# Patient Record
Sex: FEMALE | Race: ASIAN | NOT HISPANIC OR LATINO | Employment: FULL TIME | URBAN - METROPOLITAN AREA
[De-identification: names, ages, dates, MRNs, and addresses within clinical notes are randomized per-mention and may not be internally consistent; named-entity substitution may affect disease eponyms.]

---

## 2017-04-20 LAB
ABO + RH BLD: NORMAL
ABO + RH BLD: NORMAL
C TRACH DNA SPEC QL PROBE+SIG AMP: NEGATIVE
HBV SURFACE AG SERPL QL IA: NEGATIVE
HIV 1+2 AB+HIV1 P24 AG SERPL QL IA: NORMAL
N GONORRHOEA DNA SPEC QL PROBE+SIG AMP: NEGATIVE
RUBELLA ANTIBODY IGG QUANTITATIVE: NORMAL IU/ML
T PALLIDUM IGG SER QL: NORMAL

## 2017-10-12 LAB — GROUP B STREP PCR: NEGATIVE

## 2017-11-13 ENCOUNTER — HOSPITAL ENCOUNTER (INPATIENT)
Facility: CLINIC | Age: 32
LOS: 3 days | Discharge: HOME | End: 2017-11-16
Attending: OBSTETRICS & GYNECOLOGY | Admitting: OBSTETRICS & GYNECOLOGY
Payer: COMMERCIAL

## 2017-11-13 PROCEDURE — 25000132 ZZH RX MED GY IP 250 OP 250 PS 637: Performed by: OBSTETRICS & GYNECOLOGY

## 2017-11-13 PROCEDURE — 12000029 ZZH R&B OB INTERMEDIATE

## 2017-11-13 PROCEDURE — S0191 MISOPROSTOL, ORAL, 200 MCG: HCPCS | Performed by: OBSTETRICS & GYNECOLOGY

## 2017-11-13 RX ORDER — HYDROXYZINE HYDROCHLORIDE 50 MG/1
100 TABLET, FILM COATED ORAL ONCE
Status: DISCONTINUED | OUTPATIENT
Start: 2017-11-13 | End: 2017-11-14

## 2017-11-13 RX ORDER — PRENATAL VIT/IRON FUM/FOLIC AC 27MG-0.8MG
1 TABLET ORAL DAILY
COMMUNITY

## 2017-11-13 RX ORDER — MISOPROSTOL 100 UG/1
25 TABLET ORAL EVERY 4 HOURS PRN
Status: DISCONTINUED | OUTPATIENT
Start: 2017-11-13 | End: 2017-11-14

## 2017-11-13 RX ORDER — HYDROXYZINE HYDROCHLORIDE 50 MG/1
50 TABLET, FILM COATED ORAL ONCE
Status: DISCONTINUED | OUTPATIENT
Start: 2017-11-13 | End: 2017-11-14

## 2017-11-13 RX ADMIN — Medication 25 MCG: at 22:04

## 2017-11-13 NOTE — IP AVS SNAPSHOT
[address]    [place] [address]    Phone:  [phone]                                       After Visit Summary   11/13/2017    Esperanza Guerrero    MRN: [id number]           After Visit Summary Signature Page     I have received my discharge instructions, and my questions have been answered. I have discussed any challenges I see with this plan with the nurse or doctor.    ..........................................................................................................................................  Patient/Patient Representative Signature      ..........................................................................................................................................  Patient Representative Print Name and Relationship to Patient    ..................................................               ................................................  Date                                            Time    ..........................................................................................................................................  Reviewed by Signature/Title    ...................................................              ..............................................  Date                                                            Time

## 2017-11-13 NOTE — IP AVS SNAPSHOT
MRN:[id number]                      After Visit Summary   11/13/2017    Esperanza Guerrero    MRN: [id number]           Thank you!     Thank you for choosing [facility] for your care. Our goal is always to provide you with excellent care. Hearing back from our patients is one way we can continue to improve our services. Please take a few minutes to complete the written survey that you may receive in the mail after you visit with us. Thank you!        Patient Information     Date Of Birth          1985        About your hospital stay     You were admitted on:  November 13, 2017 You last received care in the:  [address]    You were discharged on:  November 16, 2017       Who to Call     For medical emergencies, please call 911.  For non-urgent questions about your medical care, please call your primary care provider or clinic, [phone]          Attending Provider     Provider Specialty    Ligia Wagner MD OB/Gyn    Elisa Rebolledo MD OB/Gyn       Primary Care Provider Office Phone # Fax #    Elisa Rebolledo [phone]      Further instructions from your care team       Postpartum Vaginal Delivery Instructions    Activity       Ask family and friends for help when you need it.    Do not place anything in your vagina for 6 weeks.    You are not restricted on other activities, but take it easy for a few weeks to allow your body to recover from delivery.  You are able to do any activities you feel up to that point.    No driving until you have stopped taking your pain medications (usually two weeks after delivery).     Call your health care provider if you have any of these symptoms:       Increased pain, swelling, redness, or fluid around your stiches from an episiotomy or perineal tear.    A fever above 100.4 F (38 C) with or without chills when placing a thermometer under your tongue.    You soak a sanitary pad with blood within 1 hour, or you  "see blood clots larger than a golf ball.    Bleeding that lasts more than 6 weeks.    Vaginal discharge that smells bad.    Severe pain, cramping or tenderness in your lower belly area.    A need to urinate more frequently (use the toilet more often), more urgently (use the toilet very quickly), or it burns when you urinate.    Nausea and vomiting.    Redness, swelling or pain around a vein in your leg.    Problems breastfeeding or a red or painful area on your breast.    Chest pain and cough or are gasping for air.    Problems coping with sadness, anxiety, or depression.  If you have any concerns about hurting yourself or the baby, call your provider immediately.     You have questions or concerns after you return home.     Keep your hands clean:  Always wash your hands before touching your perineal area and stitches.  This helps reduce your risk of infection.  If your hands aren't dirty, you may use an alcohol hand-rub to clean your hands. Keep your nails clean and short.        Pending Results     No orders found from 11/11/2017 to 11/14/2017.            Statement of Approval     Ordered          11/16/17 0903  I have reviewed and agree with all the recommendations and orders detailed in this document.  EFFECTIVE NOW     Approved and electronically signed by:  Rizwana Vickers PA-C             Admission Information     Date & Time Provider Department Dept. Phone    11/13/2017 Elisa Rebolledo MD [address] [phone]      Your Vitals Were     Blood Pressure Pulse Temperature Respirations Height Weight    117/57 66 98.4  F (36.9  C) (Oral) 16 1.626 m (5' 4\") 87.5 kg (193 lb)    BMI (Body Mass Index)                   33.13 kg/m2           MyChart Information     [facility] lets you send messages to your doctor, view your test results, renew your prescriptions, schedule appointments and more. To sign up, go to [email or web address] . Click on \"Log in\" on the left side of the " "screen, which will take you to the Welcome page. Then click on \"Sign up Now\" on the right side of the page.     You will be asked to enter the access code listed below, as well as some personal information. Please follow the directions to create your username and password.     Your access code is: [email or web address]  Expires: 2018 12:16 PM     Your access code will  in 90 days. If you need help or a new code, please call your [facility] clinic or [phone].        Care EveryWhere ID     This is your Care EveryWhere ID. This could be used by other organizations to access your [facility] medical records  [id number]        Equal Access to Services     VICKY MEDRANO : Faizan Nunez, la moscoso, curtis fisher, nickolas mackey. So [facility] [phone].    ATENCIÓN: Si habla español, tiene a keys disposición servicios gratuitos de asistencia lingüística. Llame al [phone].    We comply with applicable federal civil rights laws and [place] laws. We do not discriminate on the basis of race, color, national origin, age, disability, sex, sexual orientation, or gender identity.               Review of your medicines      START taking        Dose / Directions    acetaminophen 325 MG tablet   Commonly known as:  TYLENOL        Dose:  650 mg   Take 2 tablets (650 mg) by mouth every 4 hours as needed for mild pain or fever (greater than or equal to 38? C /100.4? F (oral) or 38.5? C/ 101.4? F (core).)   Quantity:  100 tablet   Refills:  0       hydrocortisone 2.5 % cream        Place rectally 3 times daily as needed (hemorrhoids)   Refills:  0       ibuprofen 400 MG tablet   Commonly known as:  ADVIL/MOTRIN        Dose:  400-800 mg   Take 1-2 tablets (400-800 mg) by mouth every 6 hours as needed for other (cramping)   Quantity:  120 tablet   Refills:  0       lanolin ointment        Apply topically every hour as needed for dry skin (sore nipples)   Refills:  0       " senna-docusate 8.6-50 MG per tablet   Commonly known as:  SENOKOT-S;PERICOLACE        Dose:  1-2 tablet   Take 1-2 tablets by mouth 2 times daily   Quantity:  100 tablet   Refills:  0         CONTINUE these medicines which have NOT CHANGED        Dose / Directions    prenatal multivitamin plus iron 27-0.8 MG Tabs per tablet        Dose:  1 tablet   Take 1 tablet by mouth daily   Refills:  0            Where to get your medicines      Some of these will need a paper prescription and others can be bought over the counter. Ask your nurse if you have questions.     You don't need a prescription for these medications     acetaminophen 325 MG tablet    hydrocortisone 2.5 % cream    ibuprofen 400 MG tablet    lanolin ointment    senna-docusate 8.6-50 MG per tablet                Protect others around you: Learn how to safely use, store and throw away your medicines at www.disposemymeds.org.             Medication List: This is a list of all your medications and when to take them. Check marks below indicate your daily home schedule. Keep this list as a reference.      Medications           Morning Afternoon Evening Bedtime As Needed    acetaminophen 325 MG tablet   Commonly known as:  TYLENOL   Take 2 tablets (650 mg) by mouth every 4 hours as needed for mild pain or fever (greater than or equal to 38? C /100.4? F (oral) or 38.5? C/ 101.4? F (core).)   Last time this was given:  650 mg on 11/16/2017 11:11 AM                                hydrocortisone 2.5 % cream   Place rectally 3 times daily as needed (hemorrhoids)                                ibuprofen 400 MG tablet   Commonly known as:  ADVIL/MOTRIN   Take 1-2 tablets (400-800 mg) by mouth every 6 hours as needed for other (cramping)   Last time this was given:  800 mg on 11/16/2017 11:11 AM                                lanolin ointment   Apply topically every hour as needed for dry skin (sore nipples)                                prenatal multivitamin plus iron  27-0.8 MG Tabs per tablet   Take 1 tablet by mouth daily                                senna-docusate 8.6-50 MG per tablet   Commonly known as:  SENOKOT-S;PERICOLACE   Take 1-2 tablets by mouth 2 times daily   Last time this was given:  1 tablet on 11/16/2017 11:11 AM

## 2017-11-14 ENCOUNTER — ANESTHESIA (OUTPATIENT)
Dept: OBGYN | Facility: CLINIC | Age: 32
End: 2017-11-14
Payer: COMMERCIAL

## 2017-11-14 ENCOUNTER — ANESTHESIA EVENT (OUTPATIENT)
Dept: OBGYN | Facility: CLINIC | Age: 32
End: 2017-11-14
Payer: COMMERCIAL

## 2017-11-14 LAB
ABO + RH BLD: NORMAL
ABO + RH BLD: NORMAL
SPECIMEN EXP DATE BLD: NORMAL
T PALLIDUM IGG+IGM SER QL: NEGATIVE

## 2017-11-14 PROCEDURE — 25000128 H RX IP 250 OP 636: Performed by: ANESTHESIOLOGY

## 2017-11-14 PROCEDURE — 10907ZC DRAINAGE OF AMNIOTIC FLUID, THERAPEUTIC FROM PRODUCTS OF CONCEPTION, VIA NATURAL OR ARTIFICIAL OPENING: ICD-10-PCS | Performed by: OBSTETRICS & GYNECOLOGY

## 2017-11-14 PROCEDURE — 25000125 ZZHC RX 250: Performed by: ANESTHESIOLOGY

## 2017-11-14 PROCEDURE — 25000128 H RX IP 250 OP 636: Performed by: OBSTETRICS & GYNECOLOGY

## 2017-11-14 PROCEDURE — 12000037 ZZH R&B POSTPARTUM INTERMEDIATE

## 2017-11-14 PROCEDURE — 36415 COLL VENOUS BLD VENIPUNCTURE: CPT | Performed by: OBSTETRICS & GYNECOLOGY

## 2017-11-14 PROCEDURE — 00HU33Z INSERTION OF INFUSION DEVICE INTO SPINAL CANAL, PERCUTANEOUS APPROACH: ICD-10-PCS | Performed by: ANESTHESIOLOGY

## 2017-11-14 PROCEDURE — 25000125 ZZHC RX 250: Performed by: OBSTETRICS & GYNECOLOGY

## 2017-11-14 PROCEDURE — 86901 BLOOD TYPING SEROLOGIC RH(D): CPT | Performed by: OBSTETRICS & GYNECOLOGY

## 2017-11-14 PROCEDURE — 3E033VJ INTRODUCTION OF OTHER HORMONE INTO PERIPHERAL VEIN, PERCUTANEOUS APPROACH: ICD-10-PCS | Performed by: OBSTETRICS & GYNECOLOGY

## 2017-11-14 PROCEDURE — S0191 MISOPROSTOL, ORAL, 200 MCG: HCPCS | Performed by: OBSTETRICS & GYNECOLOGY

## 2017-11-14 PROCEDURE — 37000011 ZZH ANESTHESIA WARD SERVICE

## 2017-11-14 PROCEDURE — 72200001 ZZH LABOR CARE VAGINAL DELIVERY SINGLE

## 2017-11-14 PROCEDURE — 25000132 ZZH RX MED GY IP 250 OP 250 PS 637: Performed by: OBSTETRICS & GYNECOLOGY

## 2017-11-14 PROCEDURE — 3E0R3BZ INTRODUCTION OF ANESTHETIC AGENT INTO SPINAL CANAL, PERCUTANEOUS APPROACH: ICD-10-PCS | Performed by: ANESTHESIOLOGY

## 2017-11-14 PROCEDURE — 86780 TREPONEMA PALLIDUM: CPT | Performed by: OBSTETRICS & GYNECOLOGY

## 2017-11-14 PROCEDURE — 3E0P7VZ INTRODUCTION OF HORMONE INTO FEMALE REPRODUCTIVE, VIA NATURAL OR ARTIFICIAL OPENING: ICD-10-PCS | Performed by: OBSTETRICS & GYNECOLOGY

## 2017-11-14 PROCEDURE — 86900 BLOOD TYPING SEROLOGIC ABO: CPT | Performed by: OBSTETRICS & GYNECOLOGY

## 2017-11-14 RX ORDER — NALOXONE HYDROCHLORIDE 0.4 MG/ML
.1-.4 INJECTION, SOLUTION INTRAMUSCULAR; INTRAVENOUS; SUBCUTANEOUS
Status: DISCONTINUED | OUTPATIENT
Start: 2017-11-14 | End: 2017-11-14

## 2017-11-14 RX ORDER — CARBOPROST TROMETHAMINE 250 UG/ML
250 INJECTION, SOLUTION INTRAMUSCULAR
Status: DISCONTINUED | OUTPATIENT
Start: 2017-11-14 | End: 2017-11-14

## 2017-11-14 RX ORDER — ACETAMINOPHEN 325 MG/1
650 TABLET ORAL EVERY 4 HOURS PRN
Status: DISCONTINUED | OUTPATIENT
Start: 2017-11-14 | End: 2017-11-14

## 2017-11-14 RX ORDER — EPHEDRINE SULFATE 50 MG/ML
5 INJECTION, SOLUTION INTRAMUSCULAR; INTRAVENOUS; SUBCUTANEOUS
Status: DISCONTINUED | OUTPATIENT
Start: 2017-11-14 | End: 2017-11-16 | Stop reason: CLARIF

## 2017-11-14 RX ORDER — METHYLERGONOVINE MALEATE 0.2 MG/ML
200 INJECTION INTRAVENOUS
Status: DISCONTINUED | OUTPATIENT
Start: 2017-11-14 | End: 2017-11-14

## 2017-11-14 RX ORDER — NALOXONE HYDROCHLORIDE 0.4 MG/ML
.1-.4 INJECTION, SOLUTION INTRAMUSCULAR; INTRAVENOUS; SUBCUTANEOUS
Status: DISCONTINUED | OUTPATIENT
Start: 2017-11-14 | End: 2017-11-16 | Stop reason: HOSPADM

## 2017-11-14 RX ORDER — NALBUPHINE HYDROCHLORIDE 10 MG/ML
2.5-5 INJECTION, SOLUTION INTRAMUSCULAR; INTRAVENOUS; SUBCUTANEOUS EVERY 6 HOURS PRN
Status: DISCONTINUED | OUTPATIENT
Start: 2017-11-14 | End: 2017-11-16 | Stop reason: CLARIF

## 2017-11-14 RX ORDER — OXYTOCIN/0.9 % SODIUM CHLORIDE 30/500 ML
100 PLASTIC BAG, INJECTION (ML) INTRAVENOUS CONTINUOUS
Status: DISCONTINUED | OUTPATIENT
Start: 2017-11-14 | End: 2017-11-16 | Stop reason: HOSPADM

## 2017-11-14 RX ORDER — OXYTOCIN/0.9 % SODIUM CHLORIDE 30/500 ML
340 PLASTIC BAG, INJECTION (ML) INTRAVENOUS CONTINUOUS PRN
Status: DISCONTINUED | OUTPATIENT
Start: 2017-11-14 | End: 2017-11-16 | Stop reason: HOSPADM

## 2017-11-14 RX ORDER — LANOLIN 100 %
OINTMENT (GRAM) TOPICAL
Status: DISCONTINUED | OUTPATIENT
Start: 2017-11-14 | End: 2017-11-16 | Stop reason: HOSPADM

## 2017-11-14 RX ORDER — OXYCODONE AND ACETAMINOPHEN 5; 325 MG/1; MG/1
1 TABLET ORAL
Status: DISCONTINUED | OUTPATIENT
Start: 2017-11-14 | End: 2017-11-14

## 2017-11-14 RX ORDER — OXYTOCIN 10 [USP'U]/ML
10 INJECTION, SOLUTION INTRAMUSCULAR; INTRAVENOUS
Status: DISCONTINUED | OUTPATIENT
Start: 2017-11-14 | End: 2017-11-16 | Stop reason: HOSPADM

## 2017-11-14 RX ORDER — NALOXONE HYDROCHLORIDE 0.4 MG/ML
.1-.4 INJECTION, SOLUTION INTRAMUSCULAR; INTRAVENOUS; SUBCUTANEOUS
Status: DISCONTINUED | OUTPATIENT
Start: 2017-11-14 | End: 2017-11-16 | Stop reason: CLARIF

## 2017-11-14 RX ORDER — MISOPROSTOL 200 UG/1
400 TABLET ORAL
Status: DISCONTINUED | OUTPATIENT
Start: 2017-11-14 | End: 2017-11-16 | Stop reason: HOSPADM

## 2017-11-14 RX ORDER — LIDOCAINE HYDROCHLORIDE AND EPINEPHRINE 15; 5 MG/ML; UG/ML
INJECTION, SOLUTION EPIDURAL PRN
Status: DISCONTINUED | OUTPATIENT
Start: 2017-11-14 | End: 2017-11-15 | Stop reason: HOSPADM

## 2017-11-14 RX ORDER — HYDROCORTISONE 2.5 %
CREAM (GRAM) TOPICAL 3 TIMES DAILY PRN
Status: DISCONTINUED | OUTPATIENT
Start: 2017-11-14 | End: 2017-11-16 | Stop reason: HOSPADM

## 2017-11-14 RX ORDER — SODIUM CHLORIDE, SODIUM LACTATE, POTASSIUM CHLORIDE, CALCIUM CHLORIDE 600; 310; 30; 20 MG/100ML; MG/100ML; MG/100ML; MG/100ML
INJECTION, SOLUTION INTRAVENOUS CONTINUOUS
Status: DISCONTINUED | OUTPATIENT
Start: 2017-11-14 | End: 2017-11-14

## 2017-11-14 RX ORDER — OXYTOCIN 10 [USP'U]/ML
10 INJECTION, SOLUTION INTRAMUSCULAR; INTRAVENOUS
Status: DISCONTINUED | OUTPATIENT
Start: 2017-11-14 | End: 2017-11-14

## 2017-11-14 RX ORDER — ACETAMINOPHEN 325 MG/1
650 TABLET ORAL EVERY 4 HOURS PRN
Status: DISCONTINUED | OUTPATIENT
Start: 2017-11-14 | End: 2017-11-16 | Stop reason: HOSPADM

## 2017-11-14 RX ORDER — IBUPROFEN 400 MG/1
400-800 TABLET, FILM COATED ORAL EVERY 6 HOURS PRN
Status: DISCONTINUED | OUTPATIENT
Start: 2017-11-14 | End: 2017-11-16 | Stop reason: HOSPADM

## 2017-11-14 RX ORDER — AMOXICILLIN 250 MG
1-2 CAPSULE ORAL 2 TIMES DAILY
Status: DISCONTINUED | OUTPATIENT
Start: 2017-11-14 | End: 2017-11-16 | Stop reason: HOSPADM

## 2017-11-14 RX ORDER — ONDANSETRON 2 MG/ML
4 INJECTION INTRAMUSCULAR; INTRAVENOUS EVERY 6 HOURS PRN
Status: DISCONTINUED | OUTPATIENT
Start: 2017-11-14 | End: 2017-11-14

## 2017-11-14 RX ORDER — ROPIVACAINE HYDROCHLORIDE 2 MG/ML
10 INJECTION, SOLUTION EPIDURAL; INFILTRATION; PERINEURAL ONCE
Status: COMPLETED | OUTPATIENT
Start: 2017-11-14 | End: 2017-11-14

## 2017-11-14 RX ORDER — OXYTOCIN/0.9 % SODIUM CHLORIDE 30/500 ML
1-24 PLASTIC BAG, INJECTION (ML) INTRAVENOUS CONTINUOUS
Status: DISCONTINUED | OUTPATIENT
Start: 2017-11-14 | End: 2017-11-14

## 2017-11-14 RX ORDER — OXYTOCIN/0.9 % SODIUM CHLORIDE 30/500 ML
100-340 PLASTIC BAG, INJECTION (ML) INTRAVENOUS CONTINUOUS PRN
Status: COMPLETED | OUTPATIENT
Start: 2017-11-14 | End: 2017-11-14

## 2017-11-14 RX ORDER — BISACODYL 10 MG
10 SUPPOSITORY, RECTAL RECTAL DAILY PRN
Status: DISCONTINUED | OUTPATIENT
Start: 2017-11-16 | End: 2017-11-16 | Stop reason: HOSPADM

## 2017-11-14 RX ORDER — IBUPROFEN 400 MG/1
800 TABLET, FILM COATED ORAL
Status: DISCONTINUED | OUTPATIENT
Start: 2017-11-14 | End: 2017-11-14

## 2017-11-14 RX ORDER — LIDOCAINE 40 MG/G
CREAM TOPICAL
Status: DISCONTINUED | OUTPATIENT
Start: 2017-11-14 | End: 2017-11-14

## 2017-11-14 RX ORDER — FENTANYL CITRATE 50 UG/ML
50-100 INJECTION, SOLUTION INTRAMUSCULAR; INTRAVENOUS
Status: DISCONTINUED | OUTPATIENT
Start: 2017-11-14 | End: 2017-11-14

## 2017-11-14 RX ADMIN — ACETAMINOPHEN 650 MG: 325 TABLET, FILM COATED ORAL at 18:04

## 2017-11-14 RX ADMIN — ACETAMINOPHEN 650 MG: 325 TABLET, FILM COATED ORAL at 23:40

## 2017-11-14 RX ADMIN — SENNOSIDES AND DOCUSATE SODIUM 2 TABLET: 8.6; 5 TABLET ORAL at 23:40

## 2017-11-14 RX ADMIN — Medication 5 MG: at 12:10

## 2017-11-14 RX ADMIN — LIDOCAINE HYDROCHLORIDE AND EPINEPHRINE 3 ML: 15; 5 INJECTION, SOLUTION EPIDURAL at 10:59

## 2017-11-14 RX ADMIN — SODIUM CHLORIDE, POTASSIUM CHLORIDE, SODIUM LACTATE AND CALCIUM CHLORIDE: 600; 310; 30; 20 INJECTION, SOLUTION INTRAVENOUS at 07:29

## 2017-11-14 RX ADMIN — IBUPROFEN 800 MG: 400 TABLET ORAL at 18:05

## 2017-11-14 RX ADMIN — SODIUM CHLORIDE, POTASSIUM CHLORIDE, SODIUM LACTATE AND CALCIUM CHLORIDE 500 ML: 600; 310; 30; 20 INJECTION, SOLUTION INTRAVENOUS at 12:16

## 2017-11-14 RX ADMIN — Medication 25 MCG: at 02:20

## 2017-11-14 RX ADMIN — ROPIVACAINE HYDROCHLORIDE 10 ML: 2 INJECTION, SOLUTION EPIDURAL; INFILTRATION at 11:03

## 2017-11-14 RX ADMIN — SODIUM CHLORIDE, POTASSIUM CHLORIDE, SODIUM LACTATE AND CALCIUM CHLORIDE: 600; 310; 30; 20 INJECTION, SOLUTION INTRAVENOUS at 10:10

## 2017-11-14 RX ADMIN — Medication 12 ML/HR: at 11:14

## 2017-11-14 RX ADMIN — OXYTOCIN-SODIUM CHLORIDE 0.9% IV SOLN 30 UNIT/500ML 340 ML/HR: 30-0.9/5 SOLUTION at 16:42

## 2017-11-14 RX ADMIN — IBUPROFEN 800 MG: 400 TABLET ORAL at 23:40

## 2017-11-14 RX ADMIN — OXYTOCIN-SODIUM CHLORIDE 0.9% IV SOLN 30 UNIT/500ML 2 MILLI-UNITS/MIN: 30-0.9/5 SOLUTION at 07:29

## 2017-11-14 NOTE — PLAN OF CARE
Pitocin started at 0730.  Pt encouraged to ambulate.  0930- Contractions intensifying and pt is breathing through contractions.  Encouraged to make position changes.   supportive.  1030- Pt requesting epidural.  Pitocin at 10 mu.    1055- Dr Baker at bedside for epidural.  Time out performed and ropivacaine handed off to Dr Baker.    1115- Pt in left lateral position and is now 4/90/0, comfortable.  Dr Rebolledo notified.    1200-Late decels noted, interventions performed, see flowsheet.  Pt at 5cm.  1250- Dr Rebolledo at bedside to evaluate pt.  Continue with current plan of care.   1358- Pt is complete and Dr Rebolledo notified.  Plan to labor down.  1550- Dr Rebolledo at bedside, set up for delivery.  1555-  Begin pushing  O2 applied.  Dr Rebolledo at bedside continuously.  1620-  NNP called to attend delivery for vacuum.  Pt straight cathed per Dr Rebolledo.    1626-  Vacuum applied, see flow sheet for details.    1629-  Delivery of viable male per Dr Rebolledo.  Apgars 8,9.

## 2017-11-14 NOTE — PROVIDER NOTIFICATION
Dr Bernard hernandez, updated on but not limited to SVE 1.5/85/-2 haynes score of 7, uterine activity, and pain. Ptiocin orders received, may have fentanyl or epidural for pain. Plan to AROM this morning.

## 2017-11-14 NOTE — PROGRESS NOTES
"Labor Progress Note:    S: Pt is comfortable with epidural in place.     O:  /65  Temp 98  F (36.7  C) (Temporal)  Resp 16  Ht 1.626 m (5' 4\")  Wt 87.5 kg (193 lb)  BMI 33.13 kg/m2  SVE: 90/0   TOCO: Q 5 minutes  FHR: 135 baseline. + accelerations and no decelerations.     A/P: 32 yo  at 41+1/7 wks here for induction of labor.   1. Anticipate .   2. Epidural for pain management.   3. GBS negative.     Elisa Rebolledo    "

## 2017-11-14 NOTE — H&P
"2017      32yo  @ 41.1 wga here for post-dates IOL.  Pt underwent cytotec for cervical ripening last PM.  She is now on pitocin.  Prenatal care has been uncomplicated other than anemia, and h/o E. coli UTI that was treated.  There have been no significant changes in her medical history based upon review of chart, nursing records, and examination.      O pos, ABS neg, RI, RPR NR, HIV NR, HBsAg neg, GBS NEG      /81  Temp 98.4  F (36.9  C) (Temporal)  Resp (P) 18  Ht 1.626 m (5' 4\")  Wt 87.5 kg (193 lb)  BMI 33.13 kg/m2  Gen: NAD, A&O x 3  Abd: gravid, NT  SVE: 2/80/-2, amniotomy performed, clear fluid noted  FHT: cat I  TOCO: Q3 min      A/P: 32yo  @ 41.1 wga here for post-dates IOL.  AVSS.  - continue pitocin per protocol  - pain mgmt options reviewed with pt, including fentanyl, nitrous oxide, and epidural.  Pt would like epidural at some point.  - GBS neg  - EFW ~7.5-8 lb        TONEY ZHENG MD    "

## 2017-11-14 NOTE — ANESTHESIA PROCEDURE NOTES
Peripheral nerve/Neuraxial procedure note : epidural catheter  Pre-Procedure  Performed by MARLIN ISIDRO  Location: OB      Pre-Anesthestic Checklist: patient identified, IV checked, risks and benefits discussed, informed consent and pre-op evaluation    Timeout  Correct Patient: Yes   Correct Procedure: Yes   Correct Site: Yes   Correct Laterality: N/A   Correct Position: Yes   Site Marked: N/A   .   Procedure Documentation    .    Procedure:    Epidural catheter.  Insertion Site:L3-4  (midline approach) Injection technique: LORT saline   Local skin infiltrated with mL of 1% lidocaine.  KAREN at 5 cm     Patient Prep;mask, sterile gloves, povidone-iodine 7.5% surgical scrub, patient draped.  .  Needle: Touhy needle Needle Gauge: 17.    Needle Length (Inches) 3.5  # of attempts: 1 and # of redirects:  .   . .  Catheter threaded easily  5 cm epidural space.  .   .    Assessment/Narrative  Paresthesias: No.  .  .  Aspiration negative for heme or CSF  . Test dose of 3 mL lidocaine 1.5% w/ 1:200,000 epinephrine at. Test dose negative for signs of intravascular, subdural or intrathecal injection.

## 2017-11-14 NOTE — BRIEF OP NOTE
Delivery Summary    1. IUP at 41+1/7 wks. Induction of labor: cytotec, AROM and pitocin  2. Epidural for pain management.   3. Category 1- tracing through labor.   4. GBS negative.   5. VAVD for repetitive late decelerations. Kiwi 3 pulls, 90 seconds total and 1 pop off. NICU present. See op note  6. Epidural for pain management.   7. 300 cc ebl.   8. 2nd degree midline episiotomy. Repaired with 3-0 vicryl.   9. placena intact with 3-v cord. Pitocin is given.   10 Dr. Rebolledo for delivery.

## 2017-11-14 NOTE — PLAN OF CARE
Data: Patient admitted to room 215 at . Patient is a . Prenatal record and medical history reviewed with patient.   Obstetric History       T0      L0     SAB0   TAB0   Ectopic0   Multiple0   Live Births0       # Outcome Date GA Lbr Richie/2nd Weight Sex Delivery Anes PTL Lv   1 Current               Gestational age 41w0d. Vital signs per doc flowsheet. Fetal movement present. Patient reports induction of labor as reason for admission. Support persons Rudy present.  Action: Verbal consent for EFM, external fetal monitors applied. Admission assessment completed. Patient and support persons educated on labor process. Patient instructed to report change in fetal movement, contractions, vaginal leaking of fluid or bleeding, abdominal pain, or any concerns related to the pregnancy to her nurse/physician. Patient oriented to room, call light in reach.   Response: Dr. Rebolledo informed of patient arrival and induction orders recieved. Plan per provider is Cytotec protocol, may have Vistaril for sleep. Patient verbalized understanding of education and verbalized agreement with plan. Patient denies vaginal leaking, bleeding, or contractions.

## 2017-11-15 LAB — HGB BLD-MCNC: 10.1 G/DL (ref 11.7–15.7)

## 2017-11-15 PROCEDURE — 36415 COLL VENOUS BLD VENIPUNCTURE: CPT | Performed by: OBSTETRICS & GYNECOLOGY

## 2017-11-15 PROCEDURE — 12000037 ZZH R&B POSTPARTUM INTERMEDIATE

## 2017-11-15 PROCEDURE — 85018 HEMOGLOBIN: CPT | Performed by: OBSTETRICS & GYNECOLOGY

## 2017-11-15 PROCEDURE — 25000132 ZZH RX MED GY IP 250 OP 250 PS 637: Performed by: OBSTETRICS & GYNECOLOGY

## 2017-11-15 RX ADMIN — SENNOSIDES AND DOCUSATE SODIUM 1 TABLET: 8.6; 5 TABLET ORAL at 08:57

## 2017-11-15 RX ADMIN — IBUPROFEN 800 MG: 400 TABLET ORAL at 16:51

## 2017-11-15 RX ADMIN — IBUPROFEN 800 MG: 400 TABLET ORAL at 05:46

## 2017-11-15 RX ADMIN — ACETAMINOPHEN 650 MG: 325 TABLET, FILM COATED ORAL at 05:46

## 2017-11-15 RX ADMIN — SENNOSIDES AND DOCUSATE SODIUM 2 TABLET: 8.6; 5 TABLET ORAL at 20:39

## 2017-11-15 RX ADMIN — IBUPROFEN 800 MG: 400 TABLET ORAL at 11:13

## 2017-11-15 RX ADMIN — ACETAMINOPHEN 650 MG: 325 TABLET, FILM COATED ORAL at 16:51

## 2017-11-15 RX ADMIN — IBUPROFEN 800 MG: 400 TABLET ORAL at 23:22

## 2017-11-15 RX ADMIN — ACETAMINOPHEN 650 MG: 325 TABLET, FILM COATED ORAL at 11:13

## 2017-11-15 RX ADMIN — ACETAMINOPHEN 650 MG: 325 TABLET, FILM COATED ORAL at 23:22

## 2017-11-15 NOTE — PLAN OF CARE
Problem: Patient Care Overview  Goal: Plan of Care/Patient Progress Review  Outcome: Improving  Data: Vital signs within normal limits. Postpartum checks within normal limits - see flow record. Patient eating and drinking normally. Patient able to empty bladder independently and is up ambulating. No apparent signs of infection. perineum healing well. Patient performing self cares and is able to care for infant.  Action: Patient medicated during the shift for pain. See MAR. Patient reassessed within 1 hour after each medication and pain was improved - patient stated she was comfortable. Patient education done. See flow record.  Response: Positive attachment behaviors observed with infant. Support persons is present.   Plan: Anticipate discharge on 11/16.

## 2017-11-15 NOTE — L&D DELIVERY NOTE
HOSPITAL COURSE:  Esperanza Webber was admitted last evening for Cytotec induction.  She received Cytotec vaginally, and by the morning of 2017 underwent assisted rupture of membranes with clear fluid.  She was found to be 2 cm.  She was placed on Pitocin augmentation and received epidural placement around 4 cm dilation.  She made it to complete cervical dilation and was allowed to labor down over approximately 2 hours, at which time the fetal vertex was found to be at +3 station.  She had received epidural for pain management.      She began actively pushing, and with each push, there was a repetitive late deceleration nadiring to the 60s to 70s.  For that reason, vacuum-assisted vaginal delivery was decided.  Her bladder had been emptied.  She was found to be in the BUBBA presentation at +3 station, and NICU was present.  The charge nurse and Anesthesia were made aware.  Her bladder was emptied.  A Kiwi vacuum extractor was placed 2 cm anterior to the posterior fontanelle and was placed in the green zone over 3 pulls, 1 pop off and total accrued time of 90 seconds.  A male infant was delivered over a right lateral episiotomy with no evidence of nuchal cord.  A delayed cord clamp was performed.  The cord was then clamped by myself and cut by the father.  At this point, the episiotomy was repaired with 3-0 Vicryl in the usual fashion.        The placenta delivered spontaneously Schultze presentation, intact with a 3-vessel cord.  A left vaginal wall laceration was noted and was repaired with 3-0 Vicryl in the usual fashion.  At this point, all counts were correct.      ESTIMATED BLOOD LOSS:  300 mL.       Debrief was performed.  She will be transferred to postpartum in stable condition.         EDITH ORTEGA MD             D: 2017 17:13   T: 2017 22:52   MT: [id number]      Name:     ESPERANZA WEBBER   MRN:      [id number]        Account:        [id number]   :      1985            Delivery Date:  11/14/2017      Document: [id number]

## 2017-11-15 NOTE — PROGRESS NOTES
"OB Postpartum Note    S:  Patient without complaints.  Minimal lochia. Breastfeeding well. Pain controlled.     O:  Vitals were reviewed  Blood pressure 115/73, pulse 87, temperature 98.9  F (37.2  C), temperature source Oral, resp. rate 16, height 1.626 m (5' 4\"), weight 87.5 kg (193 lb), unknown if currently breastfeeding.          Abdomen - Fundus firm, at umbilicus, nontender        Extremities - No calf tenderness, no unilateral edema    ABO   Date Value Ref Range Status   11/14/2017 O  Final     RH(D)   Date Value Ref Range Status   11/14/2017 Pos  Final     No components found for: RUBELLA    A:   Postpartum Day #1 , s/p Vacuum assisted vaginal delivery after post-dates IOL- doing well    P:  1)  Routine care--reviewed perineal care.         2)  Encouraged pumping to initiate milk supply.         3)  Anticipate discharge tomorrow.     Ani Cameron MD    "

## 2017-11-15 NOTE — PLAN OF CARE
Problem: Patient Care Overview  Goal: Plan of Care/Patient Progress Review  Outcome: No Change  Patient rates perineum discomfort 3/10. Using ice packs and tucks for comfort. Taking ibuprofen and tylenol every 6 hours with good relief. Fundus firm at level of umbilicus, lochia within normal limits, no clots. Voiding adequately. Assisted with breast feeding positioning. Encouraged to call with questions/concerns.

## 2017-11-15 NOTE — LACTATION NOTE
Initial Lactation visit. Hand out given. Recommend unlimited, frequent breast feedings: At least 8 - 12 times every 24 hours. Avoid pacifiers and supplementation with formula unless medically indicated. Explained benefits of holding baby skin on skin to help promote better breastfeeding outcomes. Encouraged Esperanza to have RN come assist with feedings.  She states the L side is harder to latch to.  Reviewed with her the importance of being patient and working on baby getting latched.  Will revisit as needed.    Nat Harrison RN, IBCLC

## 2017-11-15 NOTE — ANESTHESIA POSTPROCEDURE EVALUATION
Patient: Esperanza Guerrero    * No procedures listed *    Diagnosis:* No pre-op diagnosis entered *  Diagnosis Additional Information: No value filed.    Anesthesia Type:  No value filed.    Note:  Anesthesia Post Evaluation    Patient location during evaluation: bedside (Postpartum Unit)  Patient participation: Able to fully participate in evaluation  Level of consciousness: awake  Pain management: adequate  Airway patency: patent  Cardiovascular status: acceptable  Respiratory status: acceptable  Hydration status: acceptable  PONV: none     Anesthetic complications: None    Comments: No epidural-related complaints except point tenderness at insertion site.          Last vitals:  Vitals:    11/14/17 2340 11/15/17 0700 11/15/17 1541   BP: 122/75 115/73 (!) (P) 100/39   Pulse:  87    Resp: 16 16 (P) 16   Temp:  37.2  C (98.9  F) (P) 36.8  C (98.2  F)         Electronically Signed By: Jerardo Oneill MD  November 15, 2017  4:08 PM

## 2017-11-15 NOTE — PLAN OF CARE
Data: Esperanza Guerrero transferred to [facility] via wheelchair at 1900. Baby transferred via parent's arms.  Action: Receiving unit notified of transfer: Yes. Patient and family notified of room change. Report given to Maria Dolores HAIDER RN at 1915. Belongings sent to receiving unit. Accompanied by Registered Nurse. Oriented patient to surroundings. Call light within reach. ID bands double-checked with receiving RN.  Response: Patient tolerated transfer and is stable.

## 2017-11-15 NOTE — PLAN OF CARE
Pt. admitted from L&D  via wheelchair and transferred to bed with SBA. Pt. arrived with baby and was accompanied by Rudy,  and arrived with personal belongings. Report was taken from Ann KNOTT RN in L&D. VSS. Fundus is firm and midline.  Vaginal bleeding is scant.  SL in right hand.  Pt. oriented to the room and call light system.

## 2017-11-16 VITALS
TEMPERATURE: 98.4 F | BODY MASS INDEX: 32.95 KG/M2 | SYSTOLIC BLOOD PRESSURE: 117 MMHG | WEIGHT: 193 LBS | DIASTOLIC BLOOD PRESSURE: 57 MMHG | HEART RATE: 66 BPM | RESPIRATION RATE: 16 BRPM | HEIGHT: 64 IN

## 2017-11-16 PROCEDURE — 25000132 ZZH RX MED GY IP 250 OP 250 PS 637: Performed by: OBSTETRICS & GYNECOLOGY

## 2017-11-16 RX ORDER — AMOXICILLIN 250 MG
1-2 CAPSULE ORAL 2 TIMES DAILY
Qty: 100 TABLET | Status: ON HOLD | COMMUNITY
Start: 2017-11-16 | End: 2023-06-26

## 2017-11-16 RX ORDER — HYDROCORTISONE 2.5 %
CREAM (GRAM) TOPICAL 3 TIMES DAILY PRN
Status: ON HOLD | COMMUNITY
Start: 2017-11-16 | End: 2023-06-26

## 2017-11-16 RX ORDER — IBUPROFEN 400 MG/1
400-800 TABLET, FILM COATED ORAL EVERY 6 HOURS PRN
Qty: 120 TABLET | Status: ON HOLD | COMMUNITY
Start: 2017-11-16 | End: 2020-09-30

## 2017-11-16 RX ORDER — LANOLIN 100 %
OINTMENT (GRAM) TOPICAL
Status: ON HOLD | COMMUNITY
Start: 2017-11-16 | End: 2023-06-26

## 2017-11-16 RX ORDER — ACETAMINOPHEN 325 MG/1
650 TABLET ORAL EVERY 4 HOURS PRN
Qty: 100 TABLET | Status: ON HOLD | COMMUNITY
Start: 2017-11-16 | End: 2020-09-30

## 2017-11-16 RX ADMIN — ACETAMINOPHEN 650 MG: 325 TABLET, FILM COATED ORAL at 11:11

## 2017-11-16 RX ADMIN — IBUPROFEN 800 MG: 400 TABLET ORAL at 05:01

## 2017-11-16 RX ADMIN — ACETAMINOPHEN 650 MG: 325 TABLET, FILM COATED ORAL at 05:01

## 2017-11-16 RX ADMIN — SENNOSIDES AND DOCUSATE SODIUM 1 TABLET: 8.6; 5 TABLET ORAL at 11:11

## 2017-11-16 RX ADMIN — IBUPROFEN 800 MG: 400 TABLET ORAL at 11:11

## 2017-11-16 NOTE — PLAN OF CARE
Problem: Patient Care Overview  Goal: Plan of Care/Patient Progress Review  Outcome: Improving  Fundus firm and bleeding wnl.  VSS.  Voiding without difficulty.  Taking tylenol and ibuprofen every 6 hours with good relief.  Nipple everter given for flatter left nipple.  Independent with baby cares.  Encouraged to call with questions or concerns.  Will continue to monitor.

## 2017-11-16 NOTE — PLAN OF CARE
Problem: Patient Care Overview  Goal: Plan of Care/Patient Progress Review  Outcome: Adequate for Discharge Date Met: 11/16/17  Patient doing well, excited to go home. Good pain control with tylenol and ibuprofen PO. Mild perineal discomfort, using ice packs and tucks. Fundus firm at level of umbilicus, lochia scant. Independent with self and infant cares. Explained discharge instructions to patient and spouse and answered all questions/concerns. Patient has breast pump at home.

## 2017-11-16 NOTE — PLAN OF CARE
Problem: Patient Care Overview  Goal: Plan of Care/Patient Progress Review  Outcome: Improving  VSS. Fundus firm with appropriate vaginal flow. Pain managed well with tylenol & ibuprofen. Working on breastfeeding infant at least every 3 hours. Up in room, voiding without difficulty. Independent with self and baby cares. Receiving good support from family at bedside. Will continue to monitor.

## 2017-11-16 NOTE — LACTATION NOTE
Follow up visit.  Infant feeding better overnight.  L side is more difficult.  Was able to latch well with breast support in football hold during visit.  L nipple does not gayle as far as R so baby has more trouble initially.  Infant fed well on L during visit with audible swallowing.  Encouraged Esperanza to be more assertive in working on the L side and not to switch just because baby is fussing.  She was able to get him to latch well.  Reviewed signs infant is getting enough, process of milk coming in and outpatient lactation resources if needed upon discharge.  She had no further questions or concerns.   Nat Harrison  RN, IBCLC

## 2017-11-16 NOTE — PROGRESS NOTES
#2 Postpartum V VD    Pt states doing well.  Pain well controlled.  Nursing.    Afebrile, VSS. HGB 10.1  Fundus firm, light flow.  Had BM.   Voiding w/o problems.  Ext: w/o edema or pain.    Normal postpartum course.    Plan routine postpartum care.  Inst for discharge to home.   Recheck in 6 weeks or prn at [facility].

## 2018-01-28 ENCOUNTER — HEALTH MAINTENANCE LETTER (OUTPATIENT)
Age: 33
End: 2018-01-28

## 2020-07-22 NOTE — PROGRESS NOTES
"Date: 2020 11:41:37  Clinician: Jerardo Ferguson  Clinician NPI: [id number]  Patient: Esperanza Guerrero  Patient : 1985  Patient Address: [address], [place] [address]  Patient Phone: [phone]  Visit Protocol: URI  Patient Summary:  Esperanza is a 34 year old ( : 1985 ) female who initiated a Visit for COVID-19 (Coronavirus) evaluation and screening. When asked the question \"Please sign me up to receive news, health information and promotions. \", Esperanza responded \"No\".    Esperanza states her symptoms started 1-2 days ago.   Her symptoms consist of rhinitis, a sore throat, and nasal congestion.   Symptom details     Nasal secretions: The color of her mucus is clear.    Sore throat: Esperanza reports having mild throat pain (1-3 on a 10 point pain scale), does not have exudate on her tonsils, and can swallow liquids. The lymph nodes in her neck are not enlarged. A rash has not appeared on the skin since the sore throat started.      Esperanza denies having wheezing, nausea, teeth pain, ageusia, diarrhea, vomiting, malaise, ear pain, headache, chills, enlarged lymph nodes, myalgias, anosmia, facial pain or pressure, fever, and cough. She also denies having recent facial or sinus surgery in the past 60 days and taking antibiotic medication in the past month. She is not experiencing dyspnea.   Precipitating events  Within the past week, Esperanza has not been exposed to someone with strep throat.   Pertinent COVID-19 (Coronavirus) information  In the past 14 days, Esperanza has not worked in a congregate living setting.   She does not work or volunteer as healthcare worker or a  and does not work or volunteer in a healthcare facility.   Esperanza also has not lived in a congregate living setting in the past 14 days. She does not live with a healthcare worker.   Esperanza has not had a close contact with a laboratory-confirmed COVID-19 patient within 14 days of symptom onset.   " Pertinent medical history  Esperanza does not get yeast infections when she takes antibiotics.   Esperanza does not need a return to work/school note.   Weight: 198 lbs   Esperanza does not smoke or use smokeless tobacco.   She is pregnant and denies breastfeeding.   Weight: 198 lbs    MEDICATIONS: prenatal vitamin#56-iron-folic acid-dha oral, ALLERGIES: NKDA  Clinician Response:  Dear Esperanza,   Your symptoms show that you may have coronavirus (COVID-19). This illness can cause fever, cough and trouble breathing. Many people get a mild case and get better on their own. Some people can get very sick.  What should I do?  We would like to test you for this virus.   1. Please call [phone] to schedule your visit. Explain that you were referred by [facility] to have a COVID-19 test. Be ready to share your [facility] visit ID number.  The following will serve as your written order for this COVID Test, ordered by me, for the indication of suspected COVID [Z20.828]: The test will be ordered in [employer] our electronic health record, after you are scheduled. It will show as ordered and authorized by Dennis Augustin MD.  Order: COVID-19 (Coronavirus) PCR for SYMPTOMATIC testing from [facility].      2. When it's time for your COVID test:  Stay at least 6 feet away from others. (If someone will drive you to your test, stay in the backseat, as far away from the  as you can.)   Cover your mouth and nose with a mask, tissue or washcloth.  Go straight to the testing site. Don't make any stops on the way there or back.      3.Starting now: Stay home and away from others (self-isolate) until:   You've had no fever---and no medicine that reduces fever---for 3 full days (72 hours). And...   Your other symptoms have gotten better. For example, your cough or breathing has improved. And...   At least 10 days have passed since your symptoms started.       During this time, don't leave the house except for testing or medical care.   Stay in your own  "room, even for meals. Use your own bathroom if you can.   Stay away from others in your home. No hugging, kissing or shaking hands. No visitors.  Don't go to work, school or anywhere else.    Clean \"high touch\" surfaces often (doorknobs, counters, handles, etc.). Use a household cleaning spray or wipes. You'll find a full list of  on the EPA website: www.epa.gov/pesticide-registration/list-n-disinfectants-use-against-sars-cov-2.   Cover your mouth and nose with a mask, tissue or washcloth to avoid spreading germs.  Wash your hands and face often. Use soap and water.  Caregivers in these groups are at risk for severe illness due to COVID-19:  o People 65 years and older  o People who live in a nursing home or long-term care facility  o People with chronic disease (lung, heart, cancer, diabetes, kidney, liver, immunologic)  o People who have a weakened immune system, including those who:   Are in cancer treatment  Take medicine that weakens the immune system, such as corticosteroids  Had a bone marrow or organ transplant  Have an immune deficiency  Have poorly controlled HIV or AIDS  Are obese (body mass index of 40 or higher)  Smoke regularly   o Caregivers should wear gloves while washing dishes, handling laundry and cleaning bedrooms and bathrooms.  o Use caution when washing and drying laundry: Don't shake dirty laundry, and use the warmest water setting that you can.  o For more tips, go to www.cdc.gov/coronavirus/2019-ncov/downloads/10Things.pdf.    4.Sign up for [employer]. We know it's scary to hear that you might have COVID-19. We want to track your symptoms to make sure you're okay over the next 2 weeks. Please look for an email from [employer]---this is a free, online program that we'll use to keep in touch. To sign up, follow the link in the email. Learn more at [email or web address]  How can I take care of myself?   Get lots of rest. Drink extra fluids (unless a doctor has told you " not to).   Take Tylenol (acetaminophen) for fever or pain. If you have liver or kidney problems, ask your family doctor if it's okay to take Tylenol.   Adults can take either:    650 mg (two 325 mg pills) every 4 to 6 hours, or...   1,000 mg (two 500 mg pills) every 8 hours as needed.    Note: Don't take more than 3,000 mg in one day. Acetaminophen is found in many medicines (both prescribed and over-the-counter medicines). Read all labels to be sure you don't take too much.   For children, check the Tylenol bottle for the right dose. The dose is based on the child's age or weight.    If you have other health problems (like cancer, heart failure, an organ transplant or severe kidney disease): Call your specialty clinic if you don't feel better in the next 2 days.       Know when to call 911. Emergency warning signs include:    Trouble breathing or shortness of breath Pain or pressure in the chest that doesn't go away Feeling confused like you haven't felt before, or not being able to wake up Bluish-colored lips or face.  Where can I get more information?   [facility] -- About COVID-19: [email or web address]   CDC -- What to Do If You're Sick: www.cdc.gov/coronavirus/2019-ncov/about/steps-when-sick.html   CDC -- Ending Home Isolation: www.cdc.gov/coronavirus/2019-ncov/hcp/disposition-in-home-patients.html   CDC -- Caring for Someone: www.cdc.gov/coronavirus/2019-ncov/if-you-are-sick/care-for-someone.html   [facility] -- Interim Guidance for Hospital Discharge to Home: www.health.[facility].[place]/diseases/coronavirus/hcp/hospdischarge.pdf   [facility] clinical trials (COVID-19 research studies): clinicalaffairs.[facility]/umn-clinical-trials    Below are the COVID-19 hotlines at the [place] Department of Health ([facility]). Interpreters are available.    For health questions: Call 899-036-4729 or 1-418.598.7163 (7 a.m. to 7 p.m.) For questions about schools and childcare: Call 801-402-4423 or  6-843-527-2270 (7 a.m. to 7 p.m.)       [facility] [facility]    I am sorry you are not feeling well. Your strep test has . Based on the information provided, it is possible that you could have strep throat. When left untreated, strep can spread to other areas of the body and cause a more serious infection.  A strep test is the only way to determine if a bacterial infection or a virus is causing your sore throat. This is done in a lab where a swab of the back of your throat is collected tested for the bacteria that causes strep.  Since you chose not to use the lab order, please be seen:     In a clinic or urgent care    Within 24 hours     Call 911 or go to the emergency room if you suddenly develop a rash, are drooling or unable to swallow fluids, if you are having difficulty breathing, or feel that your throat is closing off.   Diagnosis: Pain in throat  Diagnosis ICD: R07.0  [employer] Results: [facility] [facility] -   [employer] Secondary Results: null

## 2020-09-25 PROCEDURE — U0003 INFECTIOUS AGENT DETECTION BY NUCLEIC ACID (DNA OR RNA); SEVERE ACUTE RESPIRATORY SYNDROME CORONAVIRUS 2 (SARS-COV-2) (CORONAVIRUS DISEASE [COVID-19]), AMPLIFIED PROBE TECHNIQUE, MAKING USE OF HIGH THROUGHPUT TECHNOLOGIES AS DESCRIBED BY CMS-2020-01-R: HCPCS | Performed by: PHYSICIAN ASSISTANT

## 2020-09-26 ENCOUNTER — TELEPHONE (OUTPATIENT)
Dept: EMERGENCY MEDICINE | Facility: CLINIC | Age: 35
End: 2020-09-26

## 2020-09-26 LAB
SARS-COV-2 RNA SPEC QL NAA+PROBE: ABNORMAL
SPECIMEN SOURCE: ABNORMAL

## 2020-09-26 NOTE — TELEPHONE ENCOUNTER
"Coronavirus (COVID-19) Notification    Caller Name (Patient, parent, daughter/son, grandparent, etc)  Patient     Reason for call  Notify of Positive Coronavirus (COVID-19) lab results, assess symptoms,  review [facility] recommendations    Lab Result    Lab test:  2019-nCoV rRt-PCR or SARS-CoV-2 PCR    Oropharyngeal AND/OR nasopharyngeal swabs is POSITIVE for 2019-nCoV RNA/SARS-COV-2 PCR (COVID-19 virus)    RN Recommendations/Instructions per [facility] Coronavirus COVID-19 recommendations    Brief introduction script  Introduce self then review script:  \"I am calling on behalf of [employer].  We were notified that your Coronavirus test (COVID-19) for was POSITIVE for the virus.  I have some information to relay to you but first I wanted to mention that the [place] Dept of Health will be contacting you shortly [it's possible MD already called Patient] to talk to you more about how you are feeling and other people you have had contact with who might now also have the virus.  Also, [facility] is Partnering with the [facility] for Covid-19 research, you may be contacted directly by research staff.\"    Assessment (Inquire about Patient's current symptoms)   Assessment   Current Symptoms at time of phone call: (if no symptoms, document No symptoms]  None    Symptoms onset (if applicable)  2nd positive     If at time of call, Patients symptoms hare worsened, the Patient should contact 911 or have someone drive them to Emergency Dept promptly:      If Patient calling 911, inform 911 personal that you have tested positive for the Coronavirus (COVID-19).  Place mask on and await 911 to arrive.    If Emergency Dept, If possible, please have another adult drive you to the Emergency Dept but you need to wear mask when in contact with other people.      Review information with Patient    Your result was positive. This means you have COVID-19 (coronavirus).  We have sent you a letter that reviews the " information that I'll be reviewing with you now.    How can I protect others?    If you have symptoms: stay home and away from others (self-isolate) until:    You've had no fever--and no medicine that reduces fever--for 1 full day (24 hours). And       Your other symptoms have gotten better. For example, your cough or breathing has improved. And     At least 10 days have passed since your symptoms started. (If you've been told by a doctor that you have a weak immune system, wait 20 days.)     If you don't have symptoms: Stay home and away from others (self-isolate) until at least 10 days have passed since your first positive COVID-19 test. (Date test collected)    During this time:    Stay in your own room, including for meals. Use your own bathroom if you can.    Stay away from others in your home. No hugging, kissing or shaking hands. No visitors.     Don't go to work, school or anywhere else.     Clean  high touch  surfaces often (doorknobs, counters, handles, etc.). Use a household cleaning spray or wipes. You'll find a full list on the EPA website at www.epa.gov/pesticide-registration/list-n-disinfectants-use-against-sars-cov-2.     Cover your mouth and nose with a mask, tissue or other face covering to avoid spreading germs.    Wash your hands and face often with soap and water.    Caregivers in these groups are at risk for severe illness due to COVID-19:  o People 65 years and older  o People who live in a nursing home or long-term care facility  o People with chronic disease (lung, heart, cancer, diabetes, kidney, liver, immunologic)  o People who have a weakened immune system, including those who:  - Are in cancer treatment  - Take medicine that weakens the immune system, such as corticosteroids  - Had a bone marrow or organ transplant  - Have an immune deficiency  - Have poorly controlled HIV or AIDS  - Are obese (body mass index of 40 or higher)  - Smoke regularly    Caregivers should wear gloves while  washing dishes, handling laundry and cleaning bedrooms and bathrooms.    Wash and dry laundry with special caution. Don't shake dirty laundry, and use the warmest water setting you can.    If you have a weakened immune system, ask your doctor about other actions you should take.    For more tips, go to www.cdc.gov/coronavirus/2019-ncov/downloads/10Things.pdf.    You should not go back to work until you meet the guidelines above for ending your home isolation. You don't need to be retested for COVID-19 before going back to work--studies show that you won't spread the virus if it's been at least 10 days since your symptoms started (or 20 days, if you have a weak immune system).    Employers: This document serves as formal notice of your employee's medical guidelines for going back to work. They must meet the above guidelines before going back to work in person.    How can I take care of myself?    1. Get lots of rest. Drink extra fluids (unless a doctor has told you not to).    2. Take Tylenol (acetaminophen) for fever or pain. If you have liver or kidney problems, ask your family doctor if it's okay to take Tylenol.     Take either:     650 mg (two 325 mg pills) every 4 to 6 hours, or     1,000 mg (two 500 mg pills) every 8 hours as needed.     Note: Don't take more than 3,000 mg in one day. Acetaminophen is found in many medicines (both prescribed and over-the-counter medicines). Read all labels to be sure you don't take too much.    For children, check the Tylenol bottle for the right dose (based on their age or weight).    3. If you have other health problems (like cancer, heart failure, an organ transplant or severe kidney disease): Call your specialty clinic if you don't feel better in the next 2 days.    4. Know when to call 911: Emergency warning signs include:    Trouble breathing or shortness of breath    Pain or pressure in the chest that doesn't go away    Feeling confused like you haven't felt before, or  not being able to wake up    Bluish-colored lips or face    5. Sign up for [employer]. We know it's scary to hear that you have COVID-19. We want to track your symptoms to make sure you're okay over the next 2 weeks. Please look for an email from [employer]--this is a free, online program that we'll use to keep in touch. To sign up, follow the link in the email. Learn more at [email or web address].    Where can I get more information?    [facility]: [email or web address]    Coronavirus Basics: www.health.[facility].[place]./diseases/coronavirus/basics.html    What to Do If You're Sick: www.cdc.gov/coronavirus/2019-ncov/about/steps-when-sick.html    Ending Home Isolation: www.cdc.gov/coronavirus/2019-ncov/hcp/disposition-in-home-patients.html     Caring for Someone with COVID-19: www.cdc.gov/coronavirus/2019-ncov/if-you-are-sick/care-for-someone.html     [facility] clinical trials (COVID-19 research studies): clinicalaffairs.[facility]-clinical-trials     A Positive COVID-19 letter will be sent via [employer] or the mail. (Exception, no letters sent to Presurgerical/Preprocedure Patients)    [Name]  Sydnee Roberts RN  [facility]  COVID19 Results Team RN  Ph# [phone]

## 2020-09-28 NOTE — PROGRESS NOTES
Esperanza is a term pregnant female with pregnancy complicated by COVID positive status in the middle of her pregnancy. Her mother traveled to [place] from [facility] and after her arrival the family all contracted COVID 19. It has been greater than one month since she has been symptomatic with the illness or test positive.     Elisa Rebolledo MD

## 2020-09-29 ENCOUNTER — HOSPITAL ENCOUNTER (INPATIENT)
Facility: CLINIC | Age: 35
LOS: 1 days | Discharge: HOME | End: 2020-09-30
Attending: OBSTETRICS & GYNECOLOGY | Admitting: OBSTETRICS & GYNECOLOGY
Payer: COMMERCIAL

## 2020-09-29 LAB
ABO + RH BLD: NORMAL
ABO + RH BLD: NORMAL
SPECIMEN EXP DATE BLD: NORMAL
T PALLIDUM AB SER QL: NONREACTIVE

## 2020-09-29 PROCEDURE — 86900 BLOOD TYPING SEROLOGIC ABO: CPT | Performed by: OBSTETRICS & GYNECOLOGY

## 2020-09-29 PROCEDURE — 86901 BLOOD TYPING SEROLOGIC RH(D): CPT | Performed by: OBSTETRICS & GYNECOLOGY

## 2020-09-29 PROCEDURE — 10907ZC DRAINAGE OF AMNIOTIC FLUID, THERAPEUTIC FROM PRODUCTS OF CONCEPTION, VIA NATURAL OR ARTIFICIAL OPENING: ICD-10-PCS | Performed by: OBSTETRICS & GYNECOLOGY

## 2020-09-29 PROCEDURE — 36415 COLL VENOUS BLD VENIPUNCTURE: CPT | Performed by: OBSTETRICS & GYNECOLOGY

## 2020-09-29 PROCEDURE — 86780 TREPONEMA PALLIDUM: CPT | Performed by: OBSTETRICS & GYNECOLOGY

## 2020-09-29 PROCEDURE — 25800030 ZZH RX IP 258 OP 636: Performed by: OBSTETRICS & GYNECOLOGY

## 2020-09-29 PROCEDURE — 0KQM0ZZ REPAIR PERINEUM MUSCLE, OPEN APPROACH: ICD-10-PCS | Performed by: OBSTETRICS & GYNECOLOGY

## 2020-09-29 PROCEDURE — 12000035 ZZH R&B POSTPARTUM

## 2020-09-29 PROCEDURE — 25000132 ZZH RX MED GY IP 250 OP 250 PS 637: Performed by: OBSTETRICS & GYNECOLOGY

## 2020-09-29 PROCEDURE — 25000125 ZZHC RX 250: Performed by: OBSTETRICS & GYNECOLOGY

## 2020-09-29 PROCEDURE — 72200001 ZZH LABOR CARE VAGINAL DELIVERY SINGLE

## 2020-09-29 RX ORDER — OXYTOCIN/0.9 % SODIUM CHLORIDE 30/500 ML
1-24 PLASTIC BAG, INJECTION (ML) INTRAVENOUS CONTINUOUS
Status: DISCONTINUED | OUTPATIENT
Start: 2020-09-29 | End: 2020-09-29

## 2020-09-29 RX ORDER — TRANEXAMIC ACID 10 MG/ML
1 INJECTION, SOLUTION INTRAVENOUS EVERY 30 MIN PRN
Status: DISCONTINUED | OUTPATIENT
Start: 2020-09-29 | End: 2020-09-29

## 2020-09-29 RX ORDER — LIDOCAINE 40 MG/G
CREAM TOPICAL
Status: DISCONTINUED | OUTPATIENT
Start: 2020-09-29 | End: 2020-09-29

## 2020-09-29 RX ORDER — ONDANSETRON 2 MG/ML
4 INJECTION INTRAMUSCULAR; INTRAVENOUS EVERY 6 HOURS PRN
Status: DISCONTINUED | OUTPATIENT
Start: 2020-09-29 | End: 2020-09-29

## 2020-09-29 RX ORDER — OXYCODONE AND ACETAMINOPHEN 5; 325 MG/1; MG/1
1 TABLET ORAL
Status: DISCONTINUED | OUTPATIENT
Start: 2020-09-29 | End: 2020-09-29

## 2020-09-29 RX ORDER — OXYTOCIN 10 [USP'U]/ML
10 INJECTION, SOLUTION INTRAMUSCULAR; INTRAVENOUS
Status: DISCONTINUED | OUTPATIENT
Start: 2020-09-29 | End: 2020-09-30 | Stop reason: HOSPADM

## 2020-09-29 RX ORDER — ACETAMINOPHEN 325 MG/1
650 TABLET ORAL EVERY 4 HOURS PRN
Status: DISCONTINUED | OUTPATIENT
Start: 2020-09-29 | End: 2020-09-30 | Stop reason: HOSPADM

## 2020-09-29 RX ORDER — OXYTOCIN/0.9 % SODIUM CHLORIDE 30/500 ML
100 PLASTIC BAG, INJECTION (ML) INTRAVENOUS CONTINUOUS
Status: DISCONTINUED | OUTPATIENT
Start: 2020-09-29 | End: 2020-09-30 | Stop reason: HOSPADM

## 2020-09-29 RX ORDER — SODIUM CHLORIDE, SODIUM LACTATE, POTASSIUM CHLORIDE, CALCIUM CHLORIDE 600; 310; 30; 20 MG/100ML; MG/100ML; MG/100ML; MG/100ML
INJECTION, SOLUTION INTRAVENOUS CONTINUOUS
Status: DISCONTINUED | OUTPATIENT
Start: 2020-09-29 | End: 2020-09-29

## 2020-09-29 RX ORDER — MODIFIED LANOLIN
OINTMENT (GRAM) TOPICAL
Status: DISCONTINUED | OUTPATIENT
Start: 2020-09-29 | End: 2020-09-30 | Stop reason: HOSPADM

## 2020-09-29 RX ORDER — BISACODYL 10 MG
10 SUPPOSITORY, RECTAL RECTAL DAILY PRN
Status: DISCONTINUED | OUTPATIENT
Start: 2020-10-01 | End: 2020-09-30 | Stop reason: HOSPADM

## 2020-09-29 RX ORDER — OXYTOCIN/0.9 % SODIUM CHLORIDE 30/500 ML
100-340 PLASTIC BAG, INJECTION (ML) INTRAVENOUS CONTINUOUS PRN
Status: COMPLETED | OUTPATIENT
Start: 2020-09-29 | End: 2020-09-29

## 2020-09-29 RX ORDER — NALOXONE HYDROCHLORIDE 0.4 MG/ML
.1-.4 INJECTION, SOLUTION INTRAMUSCULAR; INTRAVENOUS; SUBCUTANEOUS
Status: DISCONTINUED | OUTPATIENT
Start: 2020-09-29 | End: 2020-09-29

## 2020-09-29 RX ORDER — OXYTOCIN/0.9 % SODIUM CHLORIDE 30/500 ML
340 PLASTIC BAG, INJECTION (ML) INTRAVENOUS CONTINUOUS PRN
Status: DISCONTINUED | OUTPATIENT
Start: 2020-09-29 | End: 2020-09-30 | Stop reason: HOSPADM

## 2020-09-29 RX ORDER — HYDROCORTISONE 2.5 %
CREAM (GRAM) TOPICAL 3 TIMES DAILY PRN
Status: DISCONTINUED | OUTPATIENT
Start: 2020-09-29 | End: 2020-09-30 | Stop reason: HOSPADM

## 2020-09-29 RX ORDER — METHYLERGONOVINE MALEATE 0.2 MG/ML
200 INJECTION INTRAVENOUS
Status: DISCONTINUED | OUTPATIENT
Start: 2020-09-29 | End: 2020-09-29

## 2020-09-29 RX ORDER — TRANEXAMIC ACID 10 MG/ML
1 INJECTION, SOLUTION INTRAVENOUS EVERY 30 MIN PRN
Status: DISCONTINUED | OUTPATIENT
Start: 2020-09-29 | End: 2020-09-30 | Stop reason: HOSPADM

## 2020-09-29 RX ORDER — IBUPROFEN 400 MG/1
800 TABLET, FILM COATED ORAL
Status: DISCONTINUED | OUTPATIENT
Start: 2020-09-29 | End: 2020-09-29

## 2020-09-29 RX ORDER — AMOXICILLIN 250 MG
2 CAPSULE ORAL 2 TIMES DAILY
Status: DISCONTINUED | OUTPATIENT
Start: 2020-09-29 | End: 2020-09-30 | Stop reason: HOSPADM

## 2020-09-29 RX ORDER — NALOXONE HYDROCHLORIDE 0.4 MG/ML
.1-.4 INJECTION, SOLUTION INTRAMUSCULAR; INTRAVENOUS; SUBCUTANEOUS
Status: DISCONTINUED | OUTPATIENT
Start: 2020-09-29 | End: 2020-09-30 | Stop reason: HOSPADM

## 2020-09-29 RX ORDER — ACETAMINOPHEN 325 MG/1
650 TABLET ORAL EVERY 4 HOURS PRN
Status: DISCONTINUED | OUTPATIENT
Start: 2020-09-29 | End: 2020-09-29

## 2020-09-29 RX ORDER — OXYTOCIN 10 [USP'U]/ML
10 INJECTION, SOLUTION INTRAMUSCULAR; INTRAVENOUS
Status: DISCONTINUED | OUTPATIENT
Start: 2020-09-29 | End: 2020-09-29

## 2020-09-29 RX ORDER — CARBOPROST TROMETHAMINE 250 UG/ML
250 INJECTION, SOLUTION INTRAMUSCULAR
Status: DISCONTINUED | OUTPATIENT
Start: 2020-09-29 | End: 2020-09-29

## 2020-09-29 RX ORDER — AMOXICILLIN 250 MG
1 CAPSULE ORAL 2 TIMES DAILY
Status: DISCONTINUED | OUTPATIENT
Start: 2020-09-29 | End: 2020-09-30 | Stop reason: HOSPADM

## 2020-09-29 RX ORDER — IBUPROFEN 400 MG/1
800 TABLET, FILM COATED ORAL EVERY 6 HOURS PRN
Status: DISCONTINUED | OUTPATIENT
Start: 2020-09-29 | End: 2020-09-30 | Stop reason: HOSPADM

## 2020-09-29 RX ADMIN — Medication 2 MILLI-UNITS/MIN: at 10:57

## 2020-09-29 RX ADMIN — ACETAMINOPHEN 650 MG: 325 TABLET, FILM COATED ORAL at 23:33

## 2020-09-29 RX ADMIN — IBUPROFEN 800 MG: 400 TABLET ORAL at 23:33

## 2020-09-29 RX ADMIN — Medication 340 ML/HR: at 16:18

## 2020-09-29 RX ADMIN — SODIUM CHLORIDE, POTASSIUM CHLORIDE, SODIUM LACTATE AND CALCIUM CHLORIDE: 600; 310; 30; 20 INJECTION, SOLUTION INTRAVENOUS at 09:44

## 2020-09-29 RX ADMIN — Medication 100 ML/HR: at 16:44

## 2020-09-29 RX ADMIN — DOCUSATE SODIUM 50 MG AND SENNOSIDES 8.6 MG 1 TABLET: 8.6; 5 TABLET, FILM COATED ORAL at 20:19

## 2020-09-29 RX ADMIN — IBUPROFEN 800 MG: 400 TABLET ORAL at 17:14

## 2020-09-29 ASSESSMENT — ACTIVITIES OF DAILY LIVING (ADL)
TOILETING: 0-->INDEPENDENT
RETIRED_EATING: 0-->INDEPENDENT
BATHING: 0-->INDEPENDENT
TRANSFERRING: 0-->INDEPENDENT
FALL_HISTORY_WITHIN_LAST_SIX_MONTHS: NO
AMBULATION: 0-->INDEPENDENT
COGNITION: 0 - NO COGNITION ISSUES REPORTED
SWALLOWING: 0-->SWALLOWS FOODS/LIQUIDS WITHOUT DIFFICULTY
DRESS: 0-->INDEPENDENT
RETIRED_COMMUNICATION: 0-->UNDERSTANDS/COMMUNICATES WITHOUT DIFFICULTY

## 2020-09-29 NOTE — BRIEF OP NOTE
Delivery Summary:    1. IUP at 40 wks here for IOL. AROM and pitocin.   2. GBS negative. RH positive.   3.  of female in BUBBA presentation. Nuchal cord x 1 reduced on the perineum.   4. Perineum with 2nd degree laceration repaired with 3-0 vicryl and 20 ml 1% lidocaine.   5. Placenta intact with 3-v cord.   6. Delayed cord clamp done.  7. Dr. Rebolledo for delivery.     Elisa Rebolledo MD

## 2020-09-29 NOTE — H&P
No significant change in general health status based on examination of the patient, review of Nursing Admission Database and prenatal record.    EFW: 7lb 8oz     Elisa Rebolledo MD

## 2020-09-29 NOTE — PLAN OF CARE
Pt and spouse arrived to labor and delivery at 0810. Ambulating without difficulty. Discussed plan of care with pt and plan made with Dr. Rebolledo and pt is to break membranes and see if she starts to have uterine contractions after ROM. Dr. Rebolledo called at 0900 to discuss plan of care. MD unavailable to come to bedside to ROM. Dr. Woody in house OB consulted to rupture membranes. 0940 Dr. Woody at bedside ruptured membranes clear fluid noted. 3cm 50% posterior -3. Pt consents to have oxytocin infusion started if not in a regular contraction pattern by 11.

## 2020-09-29 NOTE — PLAN OF CARE
VS within normal limits. Pt breathing well with UC denies need for pain medication at this time. IV infusing without difficulty. Oxytocin infusing at 10MU per pump pt having strong UC per palpation every 2-3 minutes. 's with accelerations noted.

## 2020-09-29 NOTE — PROGRESS NOTES
OB In House  Asked by Primary OB to AROM patient.   Patient is 35 yo  @ 40 1/7 weeks here for elective IOL. Pregnancy complicated by COVID+ 2020. She has been asymptomatic >14 days. 2020 COVID screening is positive.     /83   Pulse 116   Temp 98.7  F (37.1  C) (Temporal)   Resp 16      Cx: 3/50/-3, cervix is posterior  AROM performed demonstrating clear fluid  FHT baseline 140, moderate variability, accels, cat 1  Ctxns: none    A/P: 35 yo  @ 40 1/7 weeks, elective IOL, GBS neg, COVID+ (asymptomatic)  AROM completed.  Pitocin augmentation to be started as indicated.  Dr. Alston to follow.  Myrna Woody MD, MD on 2020 at 9:57 AM

## 2020-09-30 VITALS
DIASTOLIC BLOOD PRESSURE: 76 MMHG | RESPIRATION RATE: 16 BRPM | TEMPERATURE: 98.2 F | HEART RATE: 67 BPM | SYSTOLIC BLOOD PRESSURE: 121 MMHG

## 2020-09-30 PROCEDURE — 25000132 ZZH RX MED GY IP 250 OP 250 PS 637: Performed by: OBSTETRICS & GYNECOLOGY

## 2020-09-30 RX ORDER — SENNOSIDES 8.6 MG
1 TABLET ORAL DAILY
Qty: 30 TABLET | Refills: 0 | Status: ON HOLD | OUTPATIENT
Start: 2020-09-30 | End: 2023-06-26

## 2020-09-30 RX ORDER — FERROUS SULFATE 325(65) MG
325 TABLET ORAL
Qty: 30 TABLET | Refills: 0 | Status: SHIPPED | OUTPATIENT
Start: 2020-09-30

## 2020-09-30 RX ORDER — ACETAMINOPHEN 325 MG/1
325-650 TABLET ORAL EVERY 6 HOURS PRN
Qty: 30 TABLET | Refills: 0 | Status: SHIPPED | OUTPATIENT
Start: 2020-09-30

## 2020-09-30 RX ORDER — ACETAMINOPHEN 325 MG/1
325-650 TABLET ORAL EVERY 6 HOURS PRN
Qty: 50 TABLET | Refills: 0 | Status: SHIPPED | OUTPATIENT
Start: 2020-09-30

## 2020-09-30 RX ORDER — IBUPROFEN 400 MG/1
400-800 TABLET, FILM COATED ORAL EVERY 6 HOURS PRN
Qty: 120 TABLET | Refills: 0 | Status: ON HOLD | OUTPATIENT
Start: 2020-09-30 | End: 2023-06-26

## 2020-09-30 RX ADMIN — IBUPROFEN 800 MG: 400 TABLET ORAL at 12:42

## 2020-09-30 RX ADMIN — ACETAMINOPHEN 650 MG: 325 TABLET, FILM COATED ORAL at 05:36

## 2020-09-30 RX ADMIN — ACETAMINOPHEN 650 MG: 325 TABLET, FILM COATED ORAL at 12:42

## 2020-09-30 RX ADMIN — DOCUSATE SODIUM 50 MG AND SENNOSIDES 8.6 MG 1 TABLET: 8.6; 5 TABLET, FILM COATED ORAL at 07:54

## 2020-09-30 RX ADMIN — IBUPROFEN 800 MG: 400 TABLET ORAL at 05:36

## 2020-09-30 NOTE — PLAN OF CARE
VSS on RA.  Fundus firm, midline, U/1.  Scant lochia rubra.  Voiding adequately.  Up independently.  Pain managed w/Tylenol and ibuprofen.  Breastfeeding well.  Bonding well w/infant.  Independent w/infant cares.  Nursing to continue to monitor.

## 2020-09-30 NOTE — PLAN OF CARE
Vital signs stable, assessment WNL. Pain controlled with Tylenol and ibuprofen; states satisfaction with pain management. Up in room independently with no complaints of dizziness, voiding without difficulty.  at bedside and supportive. Breastfeeding baby independently every 2-3 hours. Plans to discharge after infant turns 24 hours, as long as infant able to discharge. Encouraged to call RN with needs, will continue to monitor.

## 2020-09-30 NOTE — L&D DELIVERY NOTE
Delivery Date:  2020      PROCEDURE:  The patient is a 34-year-old G2, P1-0-0-1 at 40-1/7 weeks.  She was admitted for elective induction of labor.  Pregnancy was complicated by a known COVID positive status in her second trimester, which did resolve, and she was asymptomatic at the time of delivery.  She had no lasting symptoms.  She had an otherwise uncomplicated pregnancy.  She did desire elective induction at 40 weeks and was scheduled at 40-1/7 weeks.      The patient was admitted to Labor and Delivery and underwent assisted rupture of membranes with the help of our colleague, Dr. Mcintyre.  Clear fluid was noted, and she was found to be 3 cm, 50% and -3.  She was allowed to have 2 hours of ambulation prior to Pitocin starting.  She then began Pitocin and went rapidly from 6 cm to complete dilation over approximately 30 minutes.  She declined any pain management and, over 2 maternal contractions, delivered the fetal vertex in the BUBBA presentation with nuchal cord x 1 that was reduced on the perineum.  The remainder of the infant was delivered atraumatically.  A delayed cord clamp was performed.  The cord was clamped by myself and cut by the father.  A secondary perineal laceration was inspected, and the rectal sphincter was found to be intact.  The perineal body was infiltrated with 20 mL of 1% lidocaine and repaired in the usual fashion with 3-0 Vicryl.  Placenta had delivered spontaneously, Schultze presentation, intact with a 3-vessel cord.  Please see the chart for QBL.      Fetal monitoring throughout the labor did show category 1 and 2 tracing with reassurance and resolution with position change.  She will be transferred to Postpartum in stable condition.         EDITH ORTEGA MD             D: 2020   T: 2020   MT: LEIDY      Name:     JIMENA WEBBER   MRN:      [id number]        Account:        [id number]   :      1985        Delivery Date:  2020                Document: [id number]

## 2020-09-30 NOTE — DISCHARGE INSTRUCTIONS
Please call and schedule 2 week postpartum visit with PA (in person or virtual) and an 8 week postpartum physical with a physician.     Postpartum Vaginal Delivery Instructions    Activity       Ask family and friends for help when you need it.    Do not place anything in your vagina for 6 weeks.    You are not restricted on other activities, but take it easy for a few weeks to allow your body to recover from delivery.  You are able to do any activities you feel up to that point.    No driving until you have stopped taking your pain medications (usually two weeks after delivery).     Call your health care provider if you have any of these symptoms:       Increased pain, swelling, redness, or fluid around your stiches from an episiotomy or perineal tear.    A fever above 100.4 F (38 C) with or without chills when placing a thermometer under your tongue.    You soak a sanitary pad with blood within 1 hour, or you see blood clots larger than a golf ball.    Bleeding that lasts more than 6 weeks.    Vaginal discharge that smells bad.    Severe pain, cramping or tenderness in your lower belly area.    A need to urinate more frequently (use the toilet more often), more urgently (use the toilet very quickly), or it burns when you urinate.    Nausea and vomiting.    Redness, swelling or pain around a vein in your leg.    Problems breastfeeding or a red or painful area on your breast.    Chest pain and cough or are gasping for air.    Problems coping with sadness, anxiety, or depression.  If you have any concerns about hurting yourself or the baby, call your provider immediately.     You have questions or concerns after you return home.     Keep your hands clean:  Always wash your hands before touching your perineal area and stitches.  This helps reduce your risk of infection.  If your hands aren't dirty, you may use an alcohol hand-rub to clean your hands. Keep your nails clean and short.

## 2020-09-30 NOTE — PROGRESS NOTES
[facility]   Obstetrics Progress Note    Subjective:   This is the patient's first day since Vaginal delivery. She is doing well.  She is ambulating and urinating on her own. Pain is well controlled with medication. Breastfeeding is going well. Lochia is within normal limits.     Objective:   All vitals stable  Temp: 98.4  F (36.9  C) Temp src: Oral BP: 123/71 Pulse: 60   Resp: 16   O2 Device: None (Room air)      Constitutional: healthy, alert, no distress.   Abdomen: Abdomen soft, non-tender. BS normal. No masses, fundus is firm.  JOINT/EXTREMITIES: extremities normal     Last hemoglobin was   Hemoglobin   Date Value Ref Range Status   11/15/2017 10.1 (L) 11.7 - 15.7 g/dL Final   ]    Assessment:   Stable postpartum course.    Plan:   Routine cares. Ambulation encouraged  Pain control: ibuprofen and acetaminophen PRN  Diet as tolerated  Activity as tolerated  Dispo: anticipate discharge later today pending pediatrician input, conditional orders placed.       Jennifer Diego PA-C

## 2020-09-30 NOTE — LACTATION NOTE
Routine visit with Esperanza.  FOB and baby.  Baby breast feeding and mother states she has more trouble on the left breast.  Baby cueing and mother placed baby to breast at time of visit.  Baby drawing nipple in and mother is holding baby cross cradle.  Baby latched on well after LC pushed baby into obtain a deeper latch.  Esperanza states feels better.    Breastfeeding on demand 8-12x/day or sooner if baby cues.  Getting ready for discharge.  Plan: Watch for feeding cues and feed every 2-3 hours and/or on demand. Continue to use feeding log to track intake and appropriate voids and stools. Take feeding log to first follow up appointment or weight check. Encourage skin to skin to promote frequent feedings, thermoregulation and bonding. Follow-up with healthcare provider or lactation consultant for questions or concerns.     Instructed on signs/symptoms of engorgement/ plugged ducts and mastitis.  Instructed on comfort measures and when to call MD.  Has a Spectra pump for home.  Continues to nurse well per mom. No further questions at this time.   Will follow as needed.   Maribell Arreguin BSN, RN, PHN, RNC-MNN, IBCLC

## 2020-09-30 NOTE — PLAN OF CARE
Doing well,vss,voiding with out difficulty,pain control with tylenol&ibuprofen,baby breast feeding well.Plan to discharge today&follow up in clinic in 2 weeks&8 weeks or sooner with any concerns.

## 2020-09-30 NOTE — PLAN OF CARE
Patient arrived in room 4425 around 1900.  Vital signs are stable.  Able to void and empty her bladder.  Breastfeeding is going well.  She has been oriented to room, call light system, and infant safety procedure.  Educational folder has been introduced to patient.  Patient is encouraged to call whenever she has questions and concerns.  Will continue to monitor.

## 2020-09-30 NOTE — LACTATION NOTE
"Initial visit with Esperanza, THIERNO and infant. Infant swaddled and snuggling with Esperanza at this time. Infant fed well after delivery and has been sleepy since. Encouraged skin to skin contact for feeds and if infant is sleepy at feeding attempts. Esperanza states she  her first child for 12 months without issues. Encouraged to call for a latch check at infant's next feeding.    Breastfeeding general information reviewed. Advised to breastfeed exclusively, on demand, avoid pacifiers, bottles and formula unless medically indicated.    Encouraged rooming in, skin to skin, feeding on demand 8-12x/day or sooner if baby cues.  Explained benefits of holding and skin to skin. Discussed typical feeding pattern for the next 24-48 hours.     Discussed typical onset of mature milk. Instructed on hand expression and when to start pumping and introducing a bottle if needed when returning to work.     Breastfeeding going well right after delivery but sleepy now per mother. Pt has pump for use at home. Encouraged to read \"A New Beginning\". Patient thankful for LC support and advise.    All questions answered. No further questions at this time. Will follow as needed.     SRIKANTH Feldman RN, BSN, PHN, IBCLC    "

## 2020-09-30 NOTE — PLAN OF CARE
Data: Esperanza Guerrero transferred to  via wheelchair at 0700. Baby transferred via parent's arms.  Action: Receiving unit notified of transfer: Yes. Patient and family notified of room change. Report given to Elsy at 0700. Belongings sent to receiving unit. Accompanied by Registered Nurse. Oriented patient to surroundings. Call light within reach. ID bands double-checked with receiving RN.  Response: Patient tolerated transfer and is stable.

## 2020-12-27 ENCOUNTER — HEALTH MAINTENANCE LETTER (OUTPATIENT)
Age: 35
End: 2020-12-27

## 2021-10-09 ENCOUNTER — HEALTH MAINTENANCE LETTER (OUTPATIENT)
Age: 36
End: 2021-10-09

## 2022-01-29 ENCOUNTER — HEALTH MAINTENANCE LETTER (OUTPATIENT)
Age: 37
End: 2022-01-29

## 2022-09-17 ENCOUNTER — HEALTH MAINTENANCE LETTER (OUTPATIENT)
Age: 37
End: 2022-09-17

## 2023-05-06 ENCOUNTER — HEALTH MAINTENANCE LETTER (OUTPATIENT)
Age: 38
End: 2023-05-06

## 2023-06-26 ENCOUNTER — HOSPITAL ENCOUNTER (OUTPATIENT)
Facility: CLINIC | Age: 38
Discharge: HOME | End: 2023-06-26
Attending: OBSTETRICS & GYNECOLOGY | Admitting: OBSTETRICS & GYNECOLOGY
Payer: COMMERCIAL

## 2023-06-26 ENCOUNTER — HOSPITAL ENCOUNTER (EMERGENCY)
Facility: CLINIC | Age: 38
Discharge: HOME | End: 2023-06-26
Attending: EMERGENCY MEDICINE | Admitting: EMERGENCY MEDICINE
Payer: COMMERCIAL

## 2023-06-26 ENCOUNTER — HOSPITAL ENCOUNTER (OUTPATIENT)
Facility: CLINIC | Age: 38
End: 2023-06-26
Attending: OBSTETRICS & GYNECOLOGY | Admitting: OBSTETRICS & GYNECOLOGY
Payer: COMMERCIAL

## 2023-06-26 ENCOUNTER — APPOINTMENT (OUTPATIENT)
Dept: ULTRASOUND IMAGING | Facility: CLINIC | Age: 38
End: 2023-06-26
Attending: EMERGENCY MEDICINE
Payer: COMMERCIAL

## 2023-06-26 VITALS
WEIGHT: 169 LBS | DIASTOLIC BLOOD PRESSURE: 63 MMHG | HEART RATE: 73 BPM | TEMPERATURE: 97.5 F | OXYGEN SATURATION: 100 % | SYSTOLIC BLOOD PRESSURE: 113 MMHG | RESPIRATION RATE: 16 BRPM | BODY MASS INDEX: 28.85 KG/M2 | HEIGHT: 64 IN

## 2023-06-26 VITALS
RESPIRATION RATE: 16 BRPM | WEIGHT: 169 LBS | SYSTOLIC BLOOD PRESSURE: 116 MMHG | DIASTOLIC BLOOD PRESSURE: 65 MMHG | BODY MASS INDEX: 28.85 KG/M2 | TEMPERATURE: 98.5 F | HEIGHT: 64 IN | HEART RATE: 74 BPM

## 2023-06-26 DIAGNOSIS — O00.202 LEFT OVARIAN PREGNANCY WITHOUT INTRAUTERINE PREGNANCY: ICD-10-CM

## 2023-06-26 PROBLEM — O00.209 ECTOPIC PREGNANCY OF OVARY: Status: ACTIVE | Noted: 2023-06-26

## 2023-06-26 LAB
ABO/RH(D): NORMAL
ALBUMIN SERPL BCG-MCNC: 4.5 G/DL (ref 3.5–5.2)
ALBUMIN UR-MCNC: NEGATIVE MG/DL
ALP SERPL-CCNC: 82 U/L (ref 35–104)
ALT SERPL W P-5'-P-CCNC: 11 U/L (ref 0–50)
ANION GAP SERPL CALCULATED.3IONS-SCNC: 13 MMOL/L (ref 7–15)
ANTIBODY SCREEN: NEGATIVE
APPEARANCE UR: CLEAR
AST SERPL W P-5'-P-CCNC: 16 U/L (ref 0–45)
BACTERIA #/AREA URNS HPF: ABNORMAL /HPF
BASOPHILS # BLD AUTO: 0 10E3/UL (ref 0–0.2)
BASOPHILS NFR BLD AUTO: 0 %
BILIRUB DIRECT SERPL-MCNC: <0.2 MG/DL (ref 0–0.3)
BILIRUB SERPL-MCNC: 0.3 MG/DL
BILIRUB UR QL STRIP: NEGATIVE
BUN SERPL-MCNC: 14.2 MG/DL (ref 6–20)
CALCIUM SERPL-MCNC: 9.3 MG/DL (ref 8.6–10)
CHLORIDE SERPL-SCNC: 101 MMOL/L (ref 98–107)
COLOR UR AUTO: YELLOW
CREAT SERPL-MCNC: 0.73 MG/DL (ref 0.51–0.95)
DEPRECATED HCO3 PLAS-SCNC: 24 MMOL/L (ref 22–29)
EOSINOPHIL # BLD AUTO: 0.2 10E3/UL (ref 0–0.7)
EOSINOPHIL NFR BLD AUTO: 2 %
ERYTHROCYTE [DISTWIDTH] IN BLOOD BY AUTOMATED COUNT: 12.7 % (ref 10–15)
GFR SERPL CREATININE-BSD FRML MDRD: >90 ML/MIN/1.73M2
GLUCOSE SERPL-MCNC: 97 MG/DL (ref 70–99)
GLUCOSE UR STRIP-MCNC: NEGATIVE MG/DL
HCG INTACT+B SERPL-ACNC: 8475 MIU/ML
HCT VFR BLD AUTO: 36.5 % (ref 35–47)
HGB BLD-MCNC: 12.2 G/DL (ref 11.7–15.7)
HGB UR QL STRIP: ABNORMAL
IMM GRANULOCYTES # BLD: 0 10E3/UL
IMM GRANULOCYTES NFR BLD: 0 %
KETONES UR STRIP-MCNC: NEGATIVE MG/DL
LEUKOCYTE ESTERASE UR QL STRIP: NEGATIVE
LYMPHOCYTES # BLD AUTO: 2.7 10E3/UL (ref 0.8–5.3)
LYMPHOCYTES NFR BLD AUTO: 28 %
MCH RBC QN AUTO: 29.3 PG (ref 26.5–33)
MCHC RBC AUTO-ENTMCNC: 33.4 G/DL (ref 31.5–36.5)
MCV RBC AUTO: 88 FL (ref 78–100)
MONOCYTES # BLD AUTO: 0.7 10E3/UL (ref 0–1.3)
MONOCYTES NFR BLD AUTO: 8 %
MUCOUS THREADS #/AREA URNS LPF: PRESENT /LPF
NEUTROPHILS # BLD AUTO: 5.9 10E3/UL (ref 1.6–8.3)
NEUTROPHILS NFR BLD AUTO: 62 %
NITRATE UR QL: NEGATIVE
NRBC # BLD AUTO: 0 10E3/UL
NRBC BLD AUTO-RTO: 0 /100
PH UR STRIP: 5.5 [PH] (ref 5–7)
PLATELET # BLD AUTO: 261 10E3/UL (ref 150–450)
POTASSIUM SERPL-SCNC: 4.2 MMOL/L (ref 3.4–5.3)
PROT SERPL-MCNC: 7.8 G/DL (ref 6.4–8.3)
RBC # BLD AUTO: 4.16 10E6/UL (ref 3.8–5.2)
RBC URINE: 41 /HPF
SODIUM SERPL-SCNC: 138 MMOL/L (ref 136–145)
SP GR UR STRIP: 1.02 (ref 1–1.03)
SPECIMEN EXPIRATION DATE: NORMAL
SQUAMOUS EPITHELIAL: 1 /HPF
UROBILINOGEN UR STRIP-MCNC: NORMAL MG/DL
WBC # BLD AUTO: 9.6 10E3/UL (ref 4–11)
WBC URINE: 3 /HPF

## 2023-06-26 PROCEDURE — 36415 COLL VENOUS BLD VENIPUNCTURE: CPT | Performed by: EMERGENCY MEDICINE

## 2023-06-26 PROCEDURE — 76801 OB US < 14 WKS SINGLE FETUS: CPT

## 2023-06-26 PROCEDURE — 84702 CHORIONIC GONADOTROPIN TEST: CPT | Performed by: EMERGENCY MEDICINE

## 2023-06-26 PROCEDURE — 96401 CHEMO ANTI-NEOPL SQ/IM: CPT

## 2023-06-26 PROCEDURE — 81001 URINALYSIS AUTO W/SCOPE: CPT | Performed by: EMERGENCY MEDICINE

## 2023-06-26 PROCEDURE — 80053 COMPREHEN METABOLIC PANEL: CPT | Performed by: EMERGENCY MEDICINE

## 2023-06-26 PROCEDURE — 99285 EMERGENCY DEPT VISIT HI MDM: CPT | Mod: 25

## 2023-06-26 PROCEDURE — 86901 BLOOD TYPING SEROLOGIC RH(D): CPT | Performed by: EMERGENCY MEDICINE

## 2023-06-26 PROCEDURE — 250N000011 HC RX IP 250 OP 636: Mod: JZ | Performed by: OBSTETRICS & GYNECOLOGY

## 2023-06-26 PROCEDURE — 85025 COMPLETE CBC W/AUTO DIFF WBC: CPT | Performed by: EMERGENCY MEDICINE

## 2023-06-26 PROCEDURE — 87086 URINE CULTURE/COLONY COUNT: CPT | Performed by: EMERGENCY MEDICINE

## 2023-06-26 PROCEDURE — G0463 HOSPITAL OUTPT CLINIC VISIT: HCPCS | Mod: 25

## 2023-06-26 PROCEDURE — 82248 BILIRUBIN DIRECT: CPT | Performed by: EMERGENCY MEDICINE

## 2023-06-26 RX ORDER — METHOTREXATE 25 MG/ML
50 INJECTION, SOLUTION INTRA-ARTERIAL; INTRAMUSCULAR; INTRAVENOUS ONCE
Status: COMPLETED | OUTPATIENT
Start: 2023-06-26 | End: 2023-06-26

## 2023-06-26 RX ORDER — METHOTREXATE 25 MG/ML
50 INJECTION INTRA-ARTERIAL; INTRAMUSCULAR; INTRATHECAL; INTRAVENOUS ONCE
Status: DISCONTINUED | OUTPATIENT
Start: 2023-06-26 | End: 2023-06-26

## 2023-06-26 RX ADMIN — METHOTREXATE 93 MG: 25 SOLUTION INTRA-ARTERIAL; INTRAMUSCULAR; INTRATHECAL; INTRAVENOUS at 23:23

## 2023-06-26 ASSESSMENT — ACTIVITIES OF DAILY LIVING (ADL)
ADLS_ACUITY_SCORE: 18
ADLS_ACUITY_SCORE: 35

## 2023-06-27 ENCOUNTER — MEDICAL CORRESPONDENCE (OUTPATIENT)
Dept: HEALTH INFORMATION MANAGEMENT | Facility: CLINIC | Age: 38
End: 2023-06-27

## 2023-06-27 NOTE — ED NOTES
Rapid Assessment Note    History:   Esperanza Guerrero is a 37 year old female who presents with concern for an ectopic pregnancy. The patient had vaginal bleeding starting on Saturday. She has slight pain on her left side which started a couple hours ago before coming to the ED. She had an ultrasound done this morning at her OB/gyn clinic and they did not see an IUP but did see a 2.5 cm mass in the left ovary. Quantitative HCG was about 7000 apparently. They sent her here due to concern for an ectopic pregnancy. This is her 4th pregnancy. Denies lightheadedness and fever.     Exam:   General:  Alert, interactive  Cardiovascular:  Well perfused  Lungs:  No respiratory distress, no accessory muscle use  Neuro:  Moving all 4 extremities  Skin:  Warm, dry  Psych:  Normal affect      Plan of Care:   I evaluated the patient and developed an initial plan of care. I discussed this plan and explained that I, or one of my partners, would be returning to complete the evaluation. Blood work and pelvic US ordered.        I, PAOLA, am serving as a scribe to document services personally performed by Tato Garcia MD, based on my observations and the provider's statements to me.    6/26/2023  [employer]    Portions of this medical record were completed by a scribe. UPON MY REVIEW AND AUTHENTICATION BY ELECTRONIC SIGNATURE, this confirms (a) I performed the applicable clinical services, and (b) the record is accurate.      Tato Garcia MD  06/26/23 1939

## 2023-06-27 NOTE — CARE PLAN
Methotrexate given as ordered. Plan to monitor for 30 minutes then discharge to home. Pt agreeable.

## 2023-06-27 NOTE — PLAN OF CARE
Goal Outcome Evaluation:  Pt presents to maternal assessment center from ER with diagnosis of ectopic pregnancy.  Phone call to Dr Neo Bullard.  Orders given for methotrexate order set. Orders for pt to be monitored for 30 min post injection.  Clinic to call pt to schedule lab appt for Wednesday 6/28/23.  Vss, afebrile.  Pt denies feeling pain.  Pt appears to be coping well with pregnancy loss.  Friend present and supportive at bedside.  Awaiting methotrexate to be sent from pharmacy.  Verified HCG levels per [employer] system guideline, signed informed consent.  Report given to RN taking over.

## 2023-06-27 NOTE — ED PROVIDER NOTES
History     Chief Complaint:  Pregnancy Complications       HPI   Esperanza Guerrero is a 37 year old  female who was sent here due to concerns for a possible ectopic pregnancy. Her LMP was 2023.  She states that she started having some vaginal bleeding a couple of days ago and was seen in her OB/GYN's clinic today.  They apparently obtained a quantitative beta-hCG that came back around 7000 and did a bedside ultrasound in the office which was concerning for an ectopic pregnancy.  She started having some left pelvic pain about 2 hours prior to arrival here.  She does not feel lightheaded or dizzy, and she has not had a fever or any urinary symptoms.      Independent Historian:   None - Patient Only    Review of External Notes:   None      Medications:    acetaminophen (TYLENOL) 325 MG tablet  acetaminophen (TYLENOL) 325 MG tablet  ferrous sulfate (FEROSUL) 325 (65 Fe) MG tablet  hydrocortisone 2.5 % cream  ibuprofen (ADVIL/MOTRIN) 400 MG tablet  lanolin ointment  Prenatal Vit-Fe Fumarate-FA (PRENATAL MULTIVITAMIN PLUS IRON) 27-0.8 MG TABS per tablet  senna-docusate (SENOKOT-S;PERICOLACE) 8.6-50 MG per tablet  sennosides (SENOKOT) 8.6 MG tablet        Past Medical History:    No past medical history on file.    Past Surgical History:    No past surgical history on file.     Physical Exam   Patient Vitals for the past 24 hrs:   BP Temp Temp src Pulse Resp SpO2   23 (!) 140/85 97.5  F (36.4  C) Temporal 83 16 100 %        Physical Exam  Nursing note and vitals reviewed.  Constitutional:  Oriented to person, place, and time. Cooperative.   HENT:   Nose:    Nose normal.   Mouth/Throat:   Mucous membranes are normal.   Eyes:    Conjunctivae normal and EOM are normal.      Pupils are equal, round, and reactive to light.   Neck:    Trachea normal.   Cardiovascular:  Normal rate, regular rhythm, normal heart sounds and normal pulses. No murmur heard.  Pulmonary/Chest:  Effort normal and breath sounds  normal.   Abdominal:   Soft. Normal appearance and bowel sounds are normal.      There is tenderness to palpation in the left pelvic region.      There is no rebound and no CVA tenderness.   Musculoskeletal:  Extremities atraumatic x 4.   Lymphadenopathy:  No cervical adenopathy.   Neurological:   Alert and oriented to person, place, and time. Normal strength.      No cranial nerve deficit or sensory deficit. GCS eye subscore is 4. GCS verbal subscore is 5. GCS motor subscore is 6.   Skin:    Skin is intact. No rash noted.   Psychiatric:   Normal mood and affect.      Emergency Department Course   Imaging:  US OB <14 Weeks W Transvaginal   Final Result   IMPRESSION:    1.  No intrauterine gestational sac is identified. Complex mass medial to the left ovary measuring 2.5 x 2 x 1.3 cm with areas of increased vascularity. Findings are concerning for an ectopic pregnancy. In addition, there is a hypoechoic solid-appearing    lesion inferior to the left ovary measuring 2.2 x 2.2 x 1.5 cm which may represent a complex cyst, endometrioma, or less likely an ectopic. Recommend consultation with OB/GYN..               Report per radiology    Laboratory:  Labs Ordered and Resulted from Time of ED Arrival to Time of ED Departure   ROUTINE UA WITH MICROSCOPIC REFLEX TO CULTURE - Abnormal       Result Value    Color Urine Yellow      Appearance Urine Clear      Glucose Urine Negative      Bilirubin Urine Negative      Ketones Urine Negative      Specific Gravity Urine 1.023      Blood Urine Large (*)     pH Urine 5.5      Protein Albumin Urine Negative      Urobilinogen Urine Normal      Nitrite Urine Negative      Leukocyte Esterase Urine Negative      Bacteria Urine Few (*)     Mucus Urine Present (*)     RBC Urine 41 (*)     WBC Urine 3      Squamous Epithelials Urine 1     CBC WITH PLATELETS AND DIFFERENTIAL    WBC Count 9.6      RBC Count 4.16      Hemoglobin 12.2      Hematocrit 36.5      MCV 88      MCH 29.3      MCHC 33.4       RDW 12.7      Platelet Count 261      % Neutrophils 62      % Lymphocytes 28      % Monocytes 8      % Eosinophils 2      % Basophils 0      % Immature Granulocytes 0      NRBCs per 100 WBC 0      Absolute Neutrophils 5.9      Absolute Lymphocytes 2.7      Absolute Monocytes 0.7      Absolute Eosinophils 0.2      Absolute Basophils 0.0      Absolute Immature Granulocytes 0.0      Absolute NRBCs 0.0     BASIC METABOLIC PANEL   HCG QUANTITATIVE PREGNANCY   TYPE AND SCREEN, ADULT    ABO/RH(D) O POS      Antibody Screen Negative      SPECIMEN EXPIRATION DATE [id number]     URINE CULTURE   ABO/RH TYPE AND SCREEN        Procedures       Emergency Department Course & Assessments:             Interventions:  Medications - No data to display       Independent Interpretation (X-rays, CTs, rhythm strip):  None    Consultations/Discussion of Management or Tests:  I spoke with the on-call OB/GYN, Dr. Navarro       Social Determinants of Health affecting care:   None    Disposition:  The patient was discharged from the ED and transferred to L&D.     Impression & Plan    Medical Decision Making:  This is a 37-year-old female who was instructed to come here by the OB/GYN due to left pelvic pain and concern for an ectopic pregnancy.  She was hemodynamically stable, and her hemoglobin came back normal.  She also had a formal ultrasound here which showed the above findings, as well as a quantitative beta-hCG that came back greater than 8000.  Her blood type is O+.  I subsequently spoke with the on-call OB/GYN, Dr. Navarro, who was aware of the patient.  She indicated that the patient is a candidate for methotrexate, as she does not appear to have a ruptured ectopic pregnancy.  Per hospital policy, we had to discharge her from the ER and sent her to L&D where they will administer the methotrexate.  She is to follow-up with her OB/GYN per their instructions as well.  I also recommended the patient return to the ER emergently  with any concerns or worsening symptoms and specifically acute worsening of pain, feeling faint, or significant vaginal bleeding.    Diagnosis:    ICD-10-CM    1. Left ovarian pregnancy without intrauterine pregnancy  O00.202            Discharge Medications:  New Prescriptions    No medications on file            6/26/2023   Tato Garcia MD Lashkowitz, Seth H, MD  06/26/23 1580

## 2023-06-27 NOTE — ED TRIAGE NOTES
Pt C/O Left sided abd pain. Pt is 7 wks pregnant. Pt noticed spotting of blood since Saturday and is concerned of ectopic pregancy.  Pt was seen by her OB today. Pt hx has had 4 kids and 1 miscarriage        Triage Assessment     Row Name 06/26/23 1933       Triage Assessment (Adult)    Airway WDL WDL       Respiratory WDL    Respiratory WDL WDL       Skin Circulation/Temperature WDL    Skin Circulation/Temperature WDL WDL       Cardiac WDL    Cardiac WDL WDL       Peripheral/Neurovascular WDL    Peripheral Neurovascular WDL WDL       Cognitive/Neuro/Behavioral WDL    Cognitive/Neuro/Behavioral WDL WDL

## 2023-06-27 NOTE — DISCHARGE INSTRUCTIONS
Methotrexate to Treat an Ectopic Pregnancy  An ectopic pregnancy is when a fertilized egg implants outside the uterus. In most cases, it implants in a fallopian tube. This is a tube that goes from the uterus to an ovary. When this happens, the embryo can t grow normally. In some cases, it may stop growing quickly. Or it may grow until the fallopian tube tears (ruptures). This can cause severe bleeding and a risk for death for the mother.   Methotrexate is a medicine that stops the embryo from growing. The tissue is then absorbed by the mother s body. This treatment can prevent the rupture, bleeding, and risk of death to the mother. Methotrexate is often used instead of surgery to remove the embryo. Surgery has risks such as bleeding, infection, scarring of the fallopian tube, infertility, and the risks of anesthesia.   Having methotrexate treatment  Methotrexate is most often given by a shot (injection) into a muscle. It can also be given through an IV (intravenous) line.   After having the shot, you may have:  Mild belly (abdominal) pain or cramping  Nausea and vomiting  Diarrhea  Extreme tiredness (fatigue)  Care at home  Once you are home, you can resume normal activities as you are able. You will have some bleeding and pain. While you are recovering, you can use acetaminophen for pain if advised by your healthcare provider. You may be told to flush the toilet twice after each use to clear the toilet of methotrexate. Follow your provider's specific instructions.   Until your healthcare provider says it s OK, don't:   Take nonsteroidal anti-inflammatory drugs (NSAIDs), such as aspirin, ibuprofen, or naproxen  Have foods or vitamins that contain folic acid or folate (for example, prenatal vitamins have folate)  Drink alcohol  Take penicillin or certain other antibiotics  Use tampons or douche  Have sex  Make sure to:  Stay out of the sun during the first week after your shot. Sun can cause a rash during this  time.  Use birth control for at least 3 months after treatment.  Talk with a counselor if you feel sadness or grief after pregnancy loss.  Follow up  You will have blood tests several times in the weeks after you have the shot. This is to make sure that your pregnancy hormone (HCG) level is getting lower. This shows that the baby is no longer growing. It may take up to 4 weeks for your level to drop to 0. Most women need only 1 shot. If HCG levels are not low enough, your healthcare provider may give you a second shot. In some cases, this treatment does not work and surgery is needed. Your provider can tell you more about surgery for ectopic pregnancy.   When to call the healthcare provider  Call your healthcare provider if you have:  Lower belly pain that gets worse or doesn t go away  Heavy vaginal bleeding  Nausea or vomiting that needs treatment  Dizziness, weakness, or fainting  Fever of 100.4 F (38 C) or higher, or as directed by your provider  [employer] last reviewed this educational content on 3/1/2020    9157-2026 [employer]. All rights reserved. This information is not intended as a substitute for professional medical care. Always follow your healthcare professional's instructions.

## 2023-06-28 LAB
BACTERIA UR CULT: ABNORMAL
BACTERIA UR CULT: ABNORMAL

## 2023-06-28 NOTE — RESULT ENCOUNTER NOTE
[facility] Emergency Dept discharge antibiotic (if prescribed): None  Recommendations in treatment per [facility] ED Lab result Urine culture protocol. Await final culture result

## 2023-06-29 ENCOUNTER — TELEPHONE (OUTPATIENT)
Dept: EMERGENCY MEDICINE | Facility: CLINIC | Age: 38
End: 2023-06-29
Payer: COMMERCIAL

## 2023-06-29 NOTE — TELEPHONE ENCOUNTER
[facility] Emergency Department/Urgent Care Lab result notification  [Note:  ED Lab Results RN will reference the [facility] Emergency Dept visit note prior to contacting patient AND/OR prior to consulting Emergency Dept Provider.  Highlights of Emergency Dept visit in information summary at the bottom of this telephone note]    1. Reason for call    Notify of lab results    Assess patient symptoms [if necessary]    Review ED Providers recommendations/discharge instructions (if necessary)    Advise per [facility] ED lab result protocol    2. Lab Result (including Rx patient on, if applicable).  If culture, copy of lab report at bottom.  Final urine culture on 23 shows the presence of bacteria(s): 10,000 - 50,000 CFU/mL Escherichia coli   [facility] Emergency Dept discharge antibiotic: None  Recommendations in treatment per [facility] lab result Urine Culture protocol.    3. RN Assessment (Patient's current Symptoms):    Time of call: 2:34PM    Assessment: No urinary tract infection sx's    4. RN Recommendations/Instructions per [place] ED lab result protocol    [facility] ED lab result protocol used: Urine culture    Esperanza was notified of lab result and treatment recommendations    Have urine retested in 1 week    5. Please Contact your PCP clinic or return to the Emergency department if your:    Symptoms worsen or other concerning symptoms.    Information summary from Emergency Dept/Urgent Care visit on 23  Symptoms reported at ED visit (Chief complaint, HPI) Chief Complaint:  Pregnancy Complications     HPI   Esperanza Guerrero is a 37 year old  female who was sent here due to concerns for a possible ectopic pregnancy. Her LMP was 2023.  She states that she started having some vaginal bleeding a couple of days ago and was seen in her OB/GYN's clinic today.  They apparently obtained a quantitative beta-hCG that came back around 7000 and did a bedside  ultrasound in the office which was concerning for an ectopic pregnancy.  She started having some left pelvic pain about 2 hours prior to arrival here.  She does not feel lightheaded or dizzy, and she has not had a fever or any urinary symptoms.   Significant Medical hx, if applicable (i.e. CKD, diabetes) Reviewed   Allergies No Known Allergies   Weight, if applicable Wt Readings from Last 2 Encounters:   06/26/23 76.7 kg (169 lb)   06/26/23 76.7 kg (169 lb)      Coumadin/Warfarin [Yes /No] NA   Creatinine Level (mg/dl) Creatinine   Date Value Ref Range Status   06/26/2023 0.73 0.51 - 0.95 mg/dL Final      Creatinine clearance (ml/min), if applicable Serum creatinine: 0.73 mg/dL 06/26/23 1944  Estimated creatinine clearance: 107.8 mL/min   Pregnant (Yes/No/NA) NA   Breastfeeding (Yes/No/NA) NA   ED providers Impression and Plan (applicable information) Medical Decision Making:  This is a 37-year-old female who was instructed to come here by the OB/GYN due to left pelvic pain and concern for an ectopic pregnancy.  She was hemodynamically stable, and her hemoglobin came back normal.  She also had a formal ultrasound here which showed the above findings, as well as a quantitative beta-hCG that came back greater than 8000.  Her blood type is O+.  I subsequently spoke with the on-call OB/GYN, Dr. Navarro, who was aware of the patient.  She indicated that the patient is a candidate for methotrexate, as she does not appear to have a ruptured ectopic pregnancy.  Per hospital policy, we had to discharge her from the ER and sent her to L&D where they will administer the methotrexate.  She is to follow-up with her OB/GYN per their instructions as well.  I also recommended the patient return to the ER emergently with any concerns or worsening symptoms and specifically acute worsening of pain, feeling faint, or significant vaginal bleeding.   ED diagnosis Left ovarian pregnancy without intrauterine pregnancy    ED provider  Tato Garcia MD        Copy of Lab report (if applicable)        Huey Schaefer RN  [facility]  Emergency Dept Lab Result RN  Ph# [phone]

## 2023-06-29 NOTE — RESULT ENCOUNTER NOTE
Final urine culture on 6/28/23 shows the presence of bacteria(s): 10,000 - 50,000 CFU/mL Escherichia coli   [facility] Emergency Dept discharge antibiotic: None  Recommendations in treatment per [facility] ED lab result Urine Culture protocol.

## 2023-06-30 ENCOUNTER — MEDICAL CORRESPONDENCE (OUTPATIENT)
Dept: HEALTH INFORMATION MANAGEMENT | Facility: CLINIC | Age: 38
End: 2023-06-30
Payer: COMMERCIAL

## 2023-07-02 ENCOUNTER — HOSPITAL ENCOUNTER (EMERGENCY)
Facility: CLINIC | Age: 38
End: 2023-07-02
Payer: COMMERCIAL

## 2023-07-02 ENCOUNTER — LAB (OUTPATIENT)
Dept: LAB | Facility: CLINIC | Age: 38
End: 2023-07-02
Payer: COMMERCIAL

## 2023-07-02 DIAGNOSIS — O20.0 THREATENED ABORTION: ICD-10-CM

## 2023-07-02 DIAGNOSIS — R30.0 DYSURIA: ICD-10-CM

## 2023-07-02 LAB
ALBUMIN SERPL BCG-MCNC: 4.5 G/DL (ref 3.5–5.2)
ALP SERPL-CCNC: 81 U/L (ref 35–104)
ALT SERPL W P-5'-P-CCNC: 11 U/L (ref 0–50)
ANION GAP SERPL CALCULATED.3IONS-SCNC: 12 MMOL/L (ref 7–15)
AST SERPL W P-5'-P-CCNC: 18 U/L (ref 0–45)
BASOPHILS # BLD AUTO: 0 10E3/UL (ref 0–0.2)
BASOPHILS NFR BLD AUTO: 1 %
BILIRUB SERPL-MCNC: 0.3 MG/DL
BUN SERPL-MCNC: 12.3 MG/DL (ref 6–20)
CALCIUM SERPL-MCNC: 9.1 MG/DL (ref 8.6–10)
CHLORIDE SERPL-SCNC: 104 MMOL/L (ref 98–107)
CREAT SERPL-MCNC: 0.73 MG/DL (ref 0.51–0.95)
DEPRECATED HCO3 PLAS-SCNC: 24 MMOL/L (ref 22–29)
EOSINOPHIL # BLD AUTO: 0.1 10E3/UL (ref 0–0.7)
EOSINOPHIL NFR BLD AUTO: 2 %
ERYTHROCYTE [DISTWIDTH] IN BLOOD BY AUTOMATED COUNT: 12.4 % (ref 10–15)
GFR SERPL CREATININE-BSD FRML MDRD: >90 ML/MIN/1.73M2
GLUCOSE SERPL-MCNC: 92 MG/DL (ref 70–99)
HCG INTACT+B SERPL-ACNC: 6343 MIU/ML
HCT VFR BLD AUTO: 35.3 % (ref 35–47)
HGB BLD-MCNC: 11.8 G/DL (ref 11.7–15.7)
IMM GRANULOCYTES # BLD: 0 10E3/UL
IMM GRANULOCYTES NFR BLD: 1 %
LYMPHOCYTES # BLD AUTO: 1.3 10E3/UL (ref 0.8–5.3)
LYMPHOCYTES NFR BLD AUTO: 33 %
MCH RBC QN AUTO: 29.4 PG (ref 26.5–33)
MCHC RBC AUTO-ENTMCNC: 33.4 G/DL (ref 31.5–36.5)
MCV RBC AUTO: 88 FL (ref 78–100)
MONOCYTES # BLD AUTO: 0.5 10E3/UL (ref 0–1.3)
MONOCYTES NFR BLD AUTO: 13 %
NEUTROPHILS # BLD AUTO: 2 10E3/UL (ref 1.6–8.3)
NEUTROPHILS NFR BLD AUTO: 50 %
NRBC # BLD AUTO: 0 10E3/UL
NRBC BLD AUTO-RTO: 0 /100
PLATELET # BLD AUTO: 223 10E3/UL (ref 150–450)
POTASSIUM SERPL-SCNC: 4.2 MMOL/L (ref 3.4–5.3)
PROT SERPL-MCNC: 7.6 G/DL (ref 6.4–8.3)
RBC # BLD AUTO: 4.02 10E6/UL (ref 3.8–5.2)
SODIUM SERPL-SCNC: 140 MMOL/L (ref 136–145)
WBC # BLD AUTO: 4 10E3/UL (ref 4–11)

## 2023-07-02 PROCEDURE — 85025 COMPLETE CBC W/AUTO DIFF WBC: CPT

## 2023-07-02 PROCEDURE — 80053 COMPREHEN METABOLIC PANEL: CPT

## 2023-07-02 PROCEDURE — 84702 CHORIONIC GONADOTROPIN TEST: CPT

## 2023-07-02 PROCEDURE — 36415 COLL VENOUS BLD VENIPUNCTURE: CPT

## 2023-07-02 PROCEDURE — 87086 URINE CULTURE/COLONY COUNT: CPT

## 2023-07-03 LAB — BACTERIA UR CULT: ABNORMAL

## 2024-01-12 ENCOUNTER — ANCILLARY PROCEDURE (OUTPATIENT)
Dept: GENERAL RADIOLOGY | Facility: CLINIC | Age: 39
End: 2024-01-12
Attending: OBSTETRICS & GYNECOLOGY
Payer: COMMERCIAL

## 2024-01-12 DIAGNOSIS — Z31.41 FERTILITY TESTING: ICD-10-CM

## 2024-01-12 PROCEDURE — 58340 CATHETER FOR HYSTEROGRAPHY: CPT

## 2024-05-03 LAB — HIV1+2 AB SERPL QL IA: NEGATIVE

## 2024-05-06 LAB
HEPATITIS B SURFACE ANTIGEN (EXTERNAL): NEGATIVE
RUBELLA ANTIBODY IGG (EXTERNAL): NORMAL
TREPONEMA PALLIDUM ANTIBODY (EXTERNAL): NONREACTIVE

## 2024-07-09 ENCOUNTER — TRANSFERRED RECORDS (OUTPATIENT)
Dept: HEALTH INFORMATION MANAGEMENT | Facility: CLINIC | Age: 39
End: 2024-07-09
Payer: COMMERCIAL

## 2024-07-10 ENCOUNTER — MEDICAL CORRESPONDENCE (OUTPATIENT)
Dept: HEALTH INFORMATION MANAGEMENT | Facility: CLINIC | Age: 39
End: 2024-07-10
Payer: COMMERCIAL

## 2024-07-13 ENCOUNTER — HEALTH MAINTENANCE LETTER (OUTPATIENT)
Age: 39
End: 2024-07-13

## 2024-07-23 ENCOUNTER — OFFICE VISIT (OUTPATIENT)
Dept: PEDIATRIC CARDIOLOGY | Facility: CLINIC | Age: 39
End: 2024-07-23
Payer: COMMERCIAL

## 2024-07-23 ENCOUNTER — HOSPITAL ENCOUNTER (OUTPATIENT)
Dept: CARDIOLOGY | Facility: CLINIC | Age: 39
Discharge: HOME | End: 2024-07-23
Admitting: OBSTETRICS & GYNECOLOGY
Payer: COMMERCIAL

## 2024-07-23 DIAGNOSIS — O35.BXX0 ANOMALY OF HEART OF FETUS AFFECTING PREGNANCY, ANTEPARTUM, SINGLE OR UNSPECIFIED FETUS: Primary | ICD-10-CM

## 2024-07-23 DIAGNOSIS — O35.BXX0 FETAL VENTRICULAR SEPTAL DEFECT AFFECTING ANTEPARTUM CARE OF MOTHER: ICD-10-CM

## 2024-07-23 PROCEDURE — 93325 DOPPLER ECHO COLOR FLOW MAPG: CPT

## 2024-07-23 PROCEDURE — 76827 ECHO EXAM OF FETAL HEART: CPT | Mod: 26 | Performed by: PEDIATRICS

## 2024-07-23 PROCEDURE — 76827 ECHO EXAM OF FETAL HEART: CPT

## 2024-07-23 PROCEDURE — 93325 DOPPLER ECHO COLOR FLOW MAPG: CPT | Mod: 26 | Performed by: PEDIATRICS

## 2024-07-23 PROCEDURE — 76825 ECHO EXAM OF FETAL HEART: CPT | Mod: 26 | Performed by: PEDIATRICS

## 2024-07-23 PROCEDURE — 99203 OFFICE O/P NEW LOW 30 MIN: CPT | Mod: 25 | Performed by: PEDIATRICS

## 2024-07-23 NOTE — PROGRESS NOTES
[facility]   Heart Center Fetal Consult Note    Patient:  Esperanza Guerrero MRN:  [id number]   YOB: 1985 Age:  38 year old   Date of Visit:  2024 PCP:  Elisa Rebolledo MD     Dear Doctor:    I had the pleasure of seeing Esperanza Guerrero at the [facility] on 2024 in fetal cardiology consultation for fetal echocardiogram results. She presented today accompanied by her . As you know, she is a 38 year old  at 21w3d who presented for fetal echocardiogram today because of concern for a perimembranous VSD on screening ultrasound.    I performed and interpreted the fetal echocardiogram today, which demonstrated:  Possible small perimembranous ventricular septal defect. Normal right and left ventricular size and function. Fetal heart rate is regular at 138 bpm. No hydrops.      I reviewed today's findings with Esperanza Guerrero and her partner with the help of a diagram. There is a possible small perimembranous VSD. There is a small amount of shunting seen in some views but not others. I discussed the natural history of small perimembranous VSDs, that up to 60% of these defects resolve spontaneously due to accessory tricuspid valve tissue. Given the size and location of this defect, I do not expect the child to require intervention or develop symptoms. We did, however, briefly discuss the symptoms of a hemodynamically significant VSD, which include respiratory distress, poor feeding, and poor growth. I recommend outpatient cardiology follow-up with echocardiogram at 4-6 weeks. No further fetal cardiology follow-up needed. She had appropriate questions which I addressed.     Plan:    1. Outpatient cardiology visit with echo at 4-6 weeks of life  2. No change in delivery plan needed  3. No further follow-up in fetal cardiology    Thank you for allowing me to participate in Esperanza's care. Please do not hesitate to contact me with  questions or concerns.    This visit was separate from the performance and interpretation of the ultrasound. The majority of the time (>50%) was spent in counseling and coordination of care. I spent a total of 30 minutes on the day of the visit.    Maury Ramos M.D.  Pediatric Cardiology  [facility] [address] 4th floor, [place] [address]  Phone [phone]  Fax [phone]

## 2024-07-23 NOTE — LETTER
2024      RE: Esperanza Guerrero  [address]  [place] [facility] [address]     Dear Colleague,    Thank you for the opportunity to participate in the care of your patient, Esperanza Guerrero, at the [facility] at [facility]. Please see a copy of my visit note below.    [facility]   Heart Center Fetal Consult Note    Patient:  Esperanza Guerrero MRN:  [id number]   YOB: 1985 Age:  38 year old   Date of Visit:  2024 PCP:  Elisa Rebolledo MD     Dear Doctor:    I had the pleasure of seeing Esperanza Guerrero at the [facility] on 2024 in fetal cardiology consultation for fetal echocardiogram results. She presented today accompanied by her . As you know, she is a 38 year old  at 21w3d who presented for fetal echocardiogram today because of concern for a perimembranous VSD on screening ultrasound.    I performed and interpreted the fetal echocardiogram today, which demonstrated:  Possible small perimembranous ventricular septal defect. Normal right and left ventricular size and function. Fetal heart rate is regular at 138 bpm. No hydrops.      I reviewed today's findings with Esperanza Guerrero and her partner with the help of a diagram. There is a possible small perimembranous VSD. There is a small amount of shunting seen in some views but not others. I discussed the natural history of small perimembranous VSDs, that up to 60% of these defects resolve spontaneously due to accessory tricuspid valve tissue. Given the size and location of this defect, I do not expect the child to require intervention or develop symptoms. We did, however, briefly discuss the symptoms of a hemodynamically significant VSD, which include respiratory distress, poor feeding, and poor growth. I recommend outpatient cardiology follow-up with echocardiogram at 4-6  weeks. No further fetal cardiology follow-up needed. She had appropriate questions which I addressed.     Plan:    1. Outpatient cardiology visit with echo at 4-6 weeks of life  2. No change in delivery plan needed  3. No further follow-up in fetal cardiology    Thank you for allowing me to participate in Esperanza's care. Please do not hesitate to contact me with questions or concerns.    This visit was separate from the performance and interpretation of the ultrasound. The majority of the time (>50%) was spent in counseling and coordination of care. I spent a total of 30 minutes on the day of the visit.    Maury Ramos M.D.  Pediatric Cardiology  [facility] [address] 4th [facility], [place] [address]  Phone [phone]  Fax [phone]      Please do not hesitate to contact me if you have any questions/concerns.     Sincerely,       Doyle Ramos MD

## 2024-11-24 ENCOUNTER — HOSPITAL ENCOUNTER (INPATIENT)
Facility: CLINIC | Age: 39
LOS: 2 days | Discharge: HOME | End: 2024-11-26
Attending: OBSTETRICS & GYNECOLOGY | Admitting: OBSTETRICS & GYNECOLOGY
Payer: COMMERCIAL

## 2024-11-24 LAB
ABO/RH(D): NORMAL
ANTIBODY SCREEN: NEGATIVE
BASOPHILS # BLD AUTO: 0 10E3/UL (ref 0–0.2)
BASOPHILS NFR BLD AUTO: 0 %
EOSINOPHIL # BLD AUTO: 0.1 10E3/UL (ref 0–0.7)
EOSINOPHIL NFR BLD AUTO: 1 %
ERYTHROCYTE [DISTWIDTH] IN BLOOD BY AUTOMATED COUNT: 14.1 % (ref 10–15)
HCT VFR BLD AUTO: 34.1 % (ref 35–47)
HGB BLD-MCNC: 11.3 G/DL (ref 11.7–15.7)
IMM GRANULOCYTES # BLD: 0.2 10E3/UL
IMM GRANULOCYTES NFR BLD: 2 %
LYMPHOCYTES # BLD AUTO: 1.7 10E3/UL (ref 0.8–5.3)
LYMPHOCYTES NFR BLD AUTO: 18 %
MCH RBC QN AUTO: 30.5 PG (ref 26.5–33)
MCHC RBC AUTO-ENTMCNC: 33.1 G/DL (ref 31.5–36.5)
MCV RBC AUTO: 92 FL (ref 78–100)
MONOCYTES # BLD AUTO: 0.8 10E3/UL (ref 0–1.3)
MONOCYTES NFR BLD AUTO: 8 %
NEUTROPHILS # BLD AUTO: 7.1 10E3/UL (ref 1.6–8.3)
NEUTROPHILS NFR BLD AUTO: 71 %
NRBC # BLD AUTO: 0 10E3/UL
NRBC BLD AUTO-RTO: 0 /100
PLATELET # BLD AUTO: 222 10E3/UL (ref 150–450)
RBC # BLD AUTO: 3.7 10E6/UL (ref 3.8–5.2)
SPECIMEN EXPIRATION DATE: NORMAL
WBC # BLD AUTO: 10 10E3/UL (ref 4–11)

## 2024-11-24 PROCEDURE — 10907ZC DRAINAGE OF AMNIOTIC FLUID, THERAPEUTIC FROM PRODUCTS OF CONCEPTION, VIA NATURAL OR ARTIFICIAL OPENING: ICD-10-PCS | Performed by: OBSTETRICS & GYNECOLOGY

## 2024-11-24 PROCEDURE — 86780 TREPONEMA PALLIDUM: CPT | Performed by: OBSTETRICS & GYNECOLOGY

## 2024-11-24 PROCEDURE — 258N000003 HC RX IP 258 OP 636: Performed by: OBSTETRICS & GYNECOLOGY

## 2024-11-24 PROCEDURE — 86900 BLOOD TYPING SEROLOGIC ABO: CPT | Performed by: OBSTETRICS & GYNECOLOGY

## 2024-11-24 PROCEDURE — 86850 RBC ANTIBODY SCREEN: CPT | Performed by: OBSTETRICS & GYNECOLOGY

## 2024-11-24 PROCEDURE — 85025 COMPLETE CBC W/AUTO DIFF WBC: CPT | Performed by: OBSTETRICS & GYNECOLOGY

## 2024-11-24 PROCEDURE — 250N000009 HC RX 250: Performed by: OBSTETRICS & GYNECOLOGY

## 2024-11-24 PROCEDURE — 120N000001 HC R&B MED SURG/OB

## 2024-11-24 RX ORDER — KETOROLAC TROMETHAMINE 30 MG/ML
30 INJECTION, SOLUTION INTRAMUSCULAR; INTRAVENOUS
Status: DISCONTINUED | OUTPATIENT
Start: 2024-11-24 | End: 2024-11-25

## 2024-11-24 RX ORDER — TRANEXAMIC ACID 10 MG/ML
1 INJECTION, SOLUTION INTRAVENOUS EVERY 30 MIN PRN
Status: DISCONTINUED | OUTPATIENT
Start: 2024-11-24 | End: 2024-11-25

## 2024-11-24 RX ORDER — LIDOCAINE 40 MG/G
CREAM TOPICAL
Status: DISCONTINUED | OUTPATIENT
Start: 2024-11-24 | End: 2024-11-25

## 2024-11-24 RX ORDER — OXYTOCIN/0.9 % SODIUM CHLORIDE 30/500 ML
100-340 PLASTIC BAG, INJECTION (ML) INTRAVENOUS CONTINUOUS PRN
Status: DISCONTINUED | OUTPATIENT
Start: 2024-11-24 | End: 2024-11-25

## 2024-11-24 RX ORDER — FENTANYL CITRATE-0.9 % NACL/PF 10 MCG/ML
100 PLASTIC BAG, INJECTION (ML) INTRAVENOUS EVERY 5 MIN PRN
Status: DISCONTINUED | OUTPATIENT
Start: 2024-11-24 | End: 2024-11-25

## 2024-11-24 RX ORDER — PROCHLORPERAZINE MALEATE 10 MG
10 TABLET ORAL EVERY 6 HOURS PRN
Status: DISCONTINUED | OUTPATIENT
Start: 2024-11-24 | End: 2024-11-25

## 2024-11-24 RX ORDER — ONDANSETRON 4 MG/1
4 TABLET, ORALLY DISINTEGRATING ORAL EVERY 6 HOURS PRN
Status: DISCONTINUED | OUTPATIENT
Start: 2024-11-24 | End: 2024-11-24

## 2024-11-24 RX ORDER — SODIUM CHLORIDE, SODIUM LACTATE, POTASSIUM CHLORIDE, CALCIUM CHLORIDE 600; 310; 30; 20 MG/100ML; MG/100ML; MG/100ML; MG/100ML
INJECTION, SOLUTION INTRAVENOUS CONTINUOUS PRN
Status: DISCONTINUED | OUTPATIENT
Start: 2024-11-24 | End: 2024-11-25

## 2024-11-24 RX ORDER — ONDANSETRON 2 MG/ML
4 INJECTION INTRAMUSCULAR; INTRAVENOUS EVERY 6 HOURS PRN
Status: DISCONTINUED | OUTPATIENT
Start: 2024-11-24 | End: 2024-11-24

## 2024-11-24 RX ORDER — MISOPROSTOL 200 UG/1
400 TABLET ORAL
Status: DISCONTINUED | OUTPATIENT
Start: 2024-11-24 | End: 2024-11-25

## 2024-11-24 RX ORDER — ONDANSETRON 4 MG/1
4 TABLET, ORALLY DISINTEGRATING ORAL EVERY 6 HOURS PRN
Status: DISCONTINUED | OUTPATIENT
Start: 2024-11-24 | End: 2024-11-25

## 2024-11-24 RX ORDER — NALOXONE HYDROCHLORIDE 0.4 MG/ML
0.2 INJECTION, SOLUTION INTRAMUSCULAR; INTRAVENOUS; SUBCUTANEOUS
Status: DISCONTINUED | OUTPATIENT
Start: 2024-11-24 | End: 2024-11-25

## 2024-11-24 RX ORDER — IBUPROFEN 800 MG/1
800 TABLET, FILM COATED ORAL
Status: DISCONTINUED | OUTPATIENT
Start: 2024-11-24 | End: 2024-11-25

## 2024-11-24 RX ORDER — LOPERAMIDE HYDROCHLORIDE 2 MG/1
4 CAPSULE ORAL
Status: DISCONTINUED | OUTPATIENT
Start: 2024-11-24 | End: 2024-11-25

## 2024-11-24 RX ORDER — NALBUPHINE HYDROCHLORIDE 10 MG/ML
2.5-5 INJECTION INTRAMUSCULAR; INTRAVENOUS; SUBCUTANEOUS EVERY 6 HOURS PRN
Status: DISCONTINUED | OUTPATIENT
Start: 2024-11-24 | End: 2024-11-25

## 2024-11-24 RX ORDER — METOCLOPRAMIDE 10 MG/1
10 TABLET ORAL EVERY 6 HOURS PRN
Status: DISCONTINUED | OUTPATIENT
Start: 2024-11-24 | End: 2024-11-25

## 2024-11-24 RX ORDER — METOCLOPRAMIDE HYDROCHLORIDE 5 MG/ML
10 INJECTION INTRAMUSCULAR; INTRAVENOUS EVERY 6 HOURS PRN
Status: DISCONTINUED | OUTPATIENT
Start: 2024-11-24 | End: 2024-11-25

## 2024-11-24 RX ORDER — NALOXONE HYDROCHLORIDE 0.4 MG/ML
0.4 INJECTION, SOLUTION INTRAMUSCULAR; INTRAVENOUS; SUBCUTANEOUS
Status: DISCONTINUED | OUTPATIENT
Start: 2024-11-24 | End: 2024-11-25

## 2024-11-24 RX ORDER — OXYTOCIN 10 [USP'U]/ML
10 INJECTION, SOLUTION INTRAMUSCULAR; INTRAVENOUS
Status: DISCONTINUED | OUTPATIENT
Start: 2024-11-24 | End: 2024-11-25

## 2024-11-24 RX ORDER — OXYTOCIN/0.9 % SODIUM CHLORIDE 30/500 ML
340 PLASTIC BAG, INJECTION (ML) INTRAVENOUS CONTINUOUS PRN
Status: DISCONTINUED | OUTPATIENT
Start: 2024-11-24 | End: 2024-11-25

## 2024-11-24 RX ORDER — OXYTOCIN/0.9 % SODIUM CHLORIDE 30/500 ML
1-24 PLASTIC BAG, INJECTION (ML) INTRAVENOUS CONTINUOUS
Status: DISCONTINUED | OUTPATIENT
Start: 2024-11-24 | End: 2024-11-25

## 2024-11-24 RX ORDER — CITRIC ACID/SODIUM CITRATE 334-500MG
30 SOLUTION, ORAL ORAL
Status: DISCONTINUED | OUTPATIENT
Start: 2024-11-24 | End: 2024-11-25

## 2024-11-24 RX ORDER — MISOPROSTOL 200 UG/1
800 TABLET ORAL
Status: DISCONTINUED | OUTPATIENT
Start: 2024-11-24 | End: 2024-11-25

## 2024-11-24 RX ORDER — LOPERAMIDE HYDROCHLORIDE 2 MG/1
2 CAPSULE ORAL
Status: DISCONTINUED | OUTPATIENT
Start: 2024-11-24 | End: 2024-11-25

## 2024-11-24 RX ORDER — CARBOPROST TROMETHAMINE 250 UG/ML
250 INJECTION, SOLUTION INTRAMUSCULAR
Status: DISCONTINUED | OUTPATIENT
Start: 2024-11-24 | End: 2024-11-25

## 2024-11-24 RX ORDER — BUPIVACAINE HYDROCHLORIDE 2.5 MG/ML
10 INJECTION, SOLUTION EPIDURAL; INFILTRATION; INTRACAUDAL ONCE
Status: DISCONTINUED | OUTPATIENT
Start: 2024-11-24 | End: 2024-11-25

## 2024-11-24 RX ORDER — ONDANSETRON 2 MG/ML
4 INJECTION INTRAMUSCULAR; INTRAVENOUS EVERY 6 HOURS PRN
Status: DISCONTINUED | OUTPATIENT
Start: 2024-11-24 | End: 2024-11-25

## 2024-11-24 RX ORDER — METHYLERGONOVINE MALEATE 0.2 MG/ML
200 INJECTION INTRAVENOUS
Status: DISCONTINUED | OUTPATIENT
Start: 2024-11-24 | End: 2024-11-25

## 2024-11-24 RX ORDER — SCOLOPAMINE TRANSDERMAL SYSTEM 1 MG/1
1 PATCH, EXTENDED RELEASE TRANSDERMAL
Status: DISCONTINUED | OUTPATIENT
Start: 2024-11-24 | End: 2024-11-25

## 2024-11-24 RX ADMIN — SODIUM CHLORIDE, POTASSIUM CHLORIDE, SODIUM LACTATE AND CALCIUM CHLORIDE: 600; 310; 30; 20 INJECTION, SOLUTION INTRAVENOUS at 19:40

## 2024-11-24 RX ADMIN — Medication 2 MILLI-UNITS/MIN: at 19:40

## 2024-11-24 ASSESSMENT — ACTIVITIES OF DAILY LIVING (ADL)
ADLS_ACUITY_SCORE: 0
FALL_HISTORY_WITHIN_LAST_SIX_MONTHS: NO
WALKING_OR_CLIMBING_STAIRS_DIFFICULTY: NO
CHANGE_IN_FUNCTIONAL_STATUS_SINCE_ONSET_OF_CURRENT_ILLNESS/INJURY: NO
TOILETING_ISSUES: NO
DIFFICULTY_EATING/SWALLOWING: NO
HEARING_DIFFICULTY_OR_DEAF: NO
ADLS_ACUITY_SCORE: 0
DIFFICULTY_COMMUNICATING: NO
DOING_ERRANDS_INDEPENDENTLY_DIFFICULTY: NO
CONCENTRATING,_REMEMBERING_OR_MAKING_DECISIONS_DIFFICULTY: NO
WEAR_GLASSES_OR_BLIND: NO
ADLS_ACUITY_SCORE: 0
DRESSING/BATHING_DIFFICULTY: NO

## 2024-11-25 LAB
HGB BLD-MCNC: 10.4 G/DL (ref 11.7–15.7)
T PALLIDUM AB SER QL: NONREACTIVE

## 2024-11-25 PROCEDURE — 0KQM0ZZ REPAIR PERINEUM MUSCLE, OPEN APPROACH: ICD-10-PCS | Performed by: OBSTETRICS & GYNECOLOGY

## 2024-11-25 PROCEDURE — 722N000001 HC LABOR CARE VAGINAL DELIVERY SINGLE

## 2024-11-25 PROCEDURE — 85018 HEMOGLOBIN: CPT | Performed by: OBSTETRICS & GYNECOLOGY

## 2024-11-25 PROCEDURE — 999N000079 HC STATISTIC IP LACTATION SERVICES 1-15 MIN

## 2024-11-25 PROCEDURE — 36415 COLL VENOUS BLD VENIPUNCTURE: CPT | Performed by: OBSTETRICS & GYNECOLOGY

## 2024-11-25 PROCEDURE — 250N000009 HC RX 250: Performed by: OBSTETRICS & GYNECOLOGY

## 2024-11-25 PROCEDURE — 250N000013 HC RX MED GY IP 250 OP 250 PS 637: Performed by: OBSTETRICS & GYNECOLOGY

## 2024-11-25 PROCEDURE — 258N000003 HC RX IP 258 OP 636: Performed by: OBSTETRICS & GYNECOLOGY

## 2024-11-25 PROCEDURE — 120N000001 HC R&B MED SURG/OB

## 2024-11-25 RX ORDER — LOPERAMIDE HYDROCHLORIDE 2 MG/1
4 CAPSULE ORAL
Status: DISCONTINUED | OUTPATIENT
Start: 2024-11-25 | End: 2024-11-26 | Stop reason: HOSPADM

## 2024-11-25 RX ORDER — LOPERAMIDE HYDROCHLORIDE 2 MG/1
2 CAPSULE ORAL
Status: DISCONTINUED | OUTPATIENT
Start: 2024-11-25 | End: 2024-11-26 | Stop reason: HOSPADM

## 2024-11-25 RX ORDER — BISACODYL 10 MG
10 SUPPOSITORY, RECTAL RECTAL DAILY PRN
Status: DISCONTINUED | OUTPATIENT
Start: 2024-11-25 | End: 2024-11-26 | Stop reason: HOSPADM

## 2024-11-25 RX ORDER — HYDROCORTISONE 25 MG/G
CREAM TOPICAL 3 TIMES DAILY PRN
Status: DISCONTINUED | OUTPATIENT
Start: 2024-11-25 | End: 2024-11-26 | Stop reason: HOSPADM

## 2024-11-25 RX ORDER — OXYTOCIN/0.9 % SODIUM CHLORIDE 30/500 ML
340 PLASTIC BAG, INJECTION (ML) INTRAVENOUS CONTINUOUS PRN
Status: DISCONTINUED | OUTPATIENT
Start: 2024-11-25 | End: 2024-11-26 | Stop reason: HOSPADM

## 2024-11-25 RX ORDER — IBUPROFEN 800 MG/1
800 TABLET, FILM COATED ORAL EVERY 6 HOURS PRN
Status: DISCONTINUED | OUTPATIENT
Start: 2024-11-25 | End: 2024-11-26 | Stop reason: HOSPADM

## 2024-11-25 RX ORDER — MISOPROSTOL 200 UG/1
800 TABLET ORAL
Status: DISCONTINUED | OUTPATIENT
Start: 2024-11-25 | End: 2024-11-26 | Stop reason: HOSPADM

## 2024-11-25 RX ORDER — MODIFIED LANOLIN
OINTMENT (GRAM) TOPICAL
Status: DISCONTINUED | OUTPATIENT
Start: 2024-11-25 | End: 2024-11-26 | Stop reason: HOSPADM

## 2024-11-25 RX ORDER — DOCUSATE SODIUM 100 MG/1
100 CAPSULE, LIQUID FILLED ORAL DAILY
Status: DISCONTINUED | OUTPATIENT
Start: 2024-11-25 | End: 2024-11-26 | Stop reason: HOSPADM

## 2024-11-25 RX ORDER — ACETAMINOPHEN 325 MG/1
650 TABLET ORAL EVERY 4 HOURS PRN
Status: DISCONTINUED | OUTPATIENT
Start: 2024-11-25 | End: 2024-11-26 | Stop reason: HOSPADM

## 2024-11-25 RX ORDER — OXYTOCIN 10 [USP'U]/ML
10 INJECTION, SOLUTION INTRAMUSCULAR; INTRAVENOUS
Status: DISCONTINUED | OUTPATIENT
Start: 2024-11-25 | End: 2024-11-26 | Stop reason: HOSPADM

## 2024-11-25 RX ORDER — MISOPROSTOL 200 UG/1
400 TABLET ORAL
Status: DISCONTINUED | OUTPATIENT
Start: 2024-11-25 | End: 2024-11-26 | Stop reason: HOSPADM

## 2024-11-25 RX ORDER — METHYLERGONOVINE MALEATE 0.2 MG/ML
200 INJECTION INTRAVENOUS
Status: DISCONTINUED | OUTPATIENT
Start: 2024-11-25 | End: 2024-11-26 | Stop reason: HOSPADM

## 2024-11-25 RX ORDER — CARBOPROST TROMETHAMINE 250 UG/ML
250 INJECTION, SOLUTION INTRAMUSCULAR
Status: DISCONTINUED | OUTPATIENT
Start: 2024-11-25 | End: 2024-11-26 | Stop reason: HOSPADM

## 2024-11-25 RX ORDER — TRANEXAMIC ACID 10 MG/ML
1 INJECTION, SOLUTION INTRAVENOUS EVERY 30 MIN PRN
Status: DISCONTINUED | OUTPATIENT
Start: 2024-11-25 | End: 2024-11-26 | Stop reason: HOSPADM

## 2024-11-25 RX ADMIN — IBUPROFEN 800 MG: 800 TABLET, FILM COATED ORAL at 06:39

## 2024-11-25 RX ADMIN — DOCUSATE SODIUM 100 MG: 100 CAPSULE, LIQUID FILLED ORAL at 07:36

## 2024-11-25 RX ADMIN — IBUPROFEN 800 MG: 800 TABLET, FILM COATED ORAL at 18:42

## 2024-11-25 RX ADMIN — SODIUM CHLORIDE, POTASSIUM CHLORIDE, SODIUM LACTATE AND CALCIUM CHLORIDE: 600; 310; 30; 20 INJECTION, SOLUTION INTRAVENOUS at 03:29

## 2024-11-25 RX ADMIN — LIDOCAINE HYDROCHLORIDE 20 ML: 10 INJECTION, SOLUTION EPIDURAL; INFILTRATION; INTRACAUDAL; PERINEURAL at 04:23

## 2024-11-25 ASSESSMENT — ACTIVITIES OF DAILY LIVING (ADL)
ADLS_ACUITY_SCORE: 0
ADLS_ACUITY_SCORE: 20
ADLS_ACUITY_SCORE: 0
ADLS_ACUITY_SCORE: 20
ADLS_ACUITY_SCORE: 0
ADLS_ACUITY_SCORE: 0
ADLS_ACUITY_SCORE: 20
ADLS_ACUITY_SCORE: 20
ADLS_ACUITY_SCORE: 0
ADLS_ACUITY_SCORE: 20
ADLS_ACUITY_SCORE: 20
ADLS_ACUITY_SCORE: 0
ADLS_ACUITY_SCORE: 20
ADLS_ACUITY_SCORE: 0
ADLS_ACUITY_SCORE: 20
ADLS_ACUITY_SCORE: 0
ADLS_ACUITY_SCORE: 0

## 2024-11-25 NOTE — PLAN OF CARE
"Goal Outcome Evaluation:      Plan of Care Reviewed With: patient    Overall Patient Progress: improvingOverall Patient Progress: improving    Outcome Evaluation: Transferred to postpartum room at 0700. Report given to CHRISTINA Ortiz.      Problem: Adult Inpatient Plan of Care  Goal: Plan of Care Review  Description: The Plan of Care Review/Shift note should be completed every shift.  The Outcome Evaluation is a brief statement about your assessment that the patient is improving, declining, or no change.  This information will be displayed automatically on your shift  note.  Outcome: Progressing  Flowsheets (Taken 11/25/2024 0720)  Outcome Evaluation: Transferred to postpartum room at 0700. Report given to CHRISTINA Ortiz.  Plan of Care Reviewed With: patient  Overall Patient Progress: improving  Goal: Patient-Specific Goal (Individualized)  Description: You can add care plan individualizations to a care plan. Examples of Individualization might be:  \"Parent requests to be called daily at 9am for status\", \"I have a hard time hearing out of my right ear\", or \"Do not touch me to wake me up as it startles  me\".  Outcome: Progressing  Goal: Absence of Hospital-Acquired Illness or Injury  Outcome: Progressing  Intervention: Prevent Skin Injury  Recent Flowsheet Documentation  Taken 11/24/2024 1916 by Kassandra Aragon, RN  Body Position: position changed independently  Intervention: Prevent and Manage VTE (Venous Thromboembolism) Risk  Recent Flowsheet Documentation  Taken 11/24/2024 1916 by Kassandra Aragon, RN  VTE Prevention/Management: (ambulation and fluids encouraged) other (see comments)  Goal: Optimal Comfort and Wellbeing  Outcome: Progressing  Intervention: Monitor Pain and Promote Comfort  Recent Flowsheet Documentation  Taken 11/25/2024 0127 by Kassandra Aragon, RN  Pain Management Interventions: care clustered  Intervention: Provide Person-Centered Care  Recent Flowsheet Documentation  Taken 11/25/2024 0030 by Margo" Kassandra HAIDER RN  Trust Relationship/Rapport:   care explained   choices provided   emotional support provided   empathic listening provided   questions answered   questions encouraged   reassurance provided   thoughts/feelings acknowledged  Taken 11/24/2024 2130 by Kassandra Aragon RN  Trust Relationship/Rapport:   care explained   choices provided   emotional support provided   empathic listening provided   questions answered   questions encouraged   reassurance provided   thoughts/feelings acknowledged  Taken 11/24/2024 1916 by Kassandra Aragon, RN  Trust Relationship/Rapport:   care explained   choices provided   emotional support provided   empathic listening provided   questions answered   questions encouraged   reassurance provided   thoughts/feelings acknowledged  Goal: Readiness for Transition of Care  Outcome: Progressing     Problem: Postpartum (Vaginal Delivery)  Goal: Successful Parent Role Transition  Outcome: Progressing  Goal: Hemostasis  Outcome: Progressing  Goal: Absence of Infection Signs and Symptoms  Outcome: Progressing  Goal: Anesthesia/Sedation Recovery  Outcome: Progressing  Goal: Optimal Pain Control and Function  Outcome: Progressing  Intervention: Prevent or Manage Pain  Recent Flowsheet Documentation  Taken 11/25/2024 0127 by Kassandra Aragon, RN  Pain Management Interventions: care clustered  Goal: Effective Urinary Elimination  Outcome: Progressing

## 2024-11-25 NOTE — PROVIDER NOTIFICATION
11/25/24 0125   Provider Notification   Provider Name/Title Dr. Rebolledo   Method of Notification Electronic Page   Request Evaluate - Remote   Notification Reason SVE;Status Update     Patient still coping and breathing well. Reports feeling more pressure with cxs, that goes away in between. Cervical recheck is 5/85/-1. Cxs have still been regular, q2-3minutes, and palpating moderate to strong. Pitocin currently at 12milleu.     Per MD, turn pitocin up to 14 and recheck pt in 2 hours or sooner if indicated.

## 2024-11-25 NOTE — PROVIDER NOTIFICATION
11/24/24 5316   Provider Notification   Provider Name/Title Dr. Rebolledo   Method of Notification At Bedside   Request Evaluate in Person     Pt has been moving/swaying/using birthing ball. Pitocin at 12 and cxs palpating moderate q 2.5-3.5 minutes. Patient reports she is more uncomfortable, but coping well with breathing/support person. Plan to keep pitocin at 12 for now, and continue to let pt labor. If not feeling increased pressure/urge to push in 1.5-2 hours, will recheck cervix at that time. MD is in-house and available for delivery when needed.

## 2024-11-25 NOTE — PROVIDER NOTIFICATION
11/25/24 0408   Provider Notification   Provider Name/Title Dr. Rebolledo   Method of Notification At Bedside     Dr. Rebolledo at bedside for delivery.

## 2024-11-25 NOTE — PROVIDER NOTIFICATION
11/25/24 0323   Provider Notification   Provider Name/Title Dr. Rebolledo   Method of Notification Electronic Page   Request Evaluate - Remote   Notification Reason SVE     Patient is now 6.5/90/0 and feeling lots of pressure with cxs. MD aware and ready for delivery when patient is complete.

## 2024-11-25 NOTE — PLAN OF CARE
Data: Esperanza Guerrero transferred to [facility] via wheelchair at 0700. Baby transferred via parent's arms.  Action: Receiving unit notified of transfer: Yes. Patient and family notified of room change. Report given to CHRISTINA Ortiz at 0705. Belongings sent to receiving unit. Accompanied by Registered Nurse. Oriented patient to surroundings. Call light within reach. ID bands double-checked with receiving RN.  Response: Patient tolerated transfer and is stable.

## 2024-11-25 NOTE — PROVIDER NOTIFICATION
11/25/24 0407   Provider Notification   Provider Name/Title Dr. Rebolledo   Method of Notification Electronic Page   Request Attend Delivery     Patient is now 9cm and +1 station. Feeling constant pressure and like pushing with cxs. Provider will come to bedside for delivery.

## 2024-11-25 NOTE — PLAN OF CARE
"Vital signs stable. Fundus is firm and midline, scant lochia. Voiding spontaneously. Ambulating independently, denies dizziness. Denies pain. Breastfeeding every 2-3 hours, encouraged mom to call for assistance feeding, declined assessment. Attentive to  cues.    Problem: Adult Inpatient Plan of Care  Goal: Plan of Care Review  Description: The Plan of Care Review/Shift note should be completed every shift.  The Outcome Evaluation is a brief statement about your assessment that the patient is improving, declining, or no change.  This information will be displayed automatically on your shift  note.  Outcome: Progressing  Flowsheets (Taken 2024 1533)  Plan of Care Reviewed With: patient  Overall Patient Progress: improving  Goal: Patient-Specific Goal (Individualized)  Description: You can add care plan individualizations to a care plan. Examples of Individualization might be:  \"Parent requests to be called daily at 9am for status\", \"I have a hard time hearing out of my right ear\", or \"Do not touch me to wake me up as it startles  me\".  Outcome: Progressing  Goal: Absence of Hospital-Acquired Illness or Injury  Outcome: Progressing  Intervention: Prevent Skin Injury  Recent Flowsheet Documentation  Taken 2024 1511 by Diana Poe, RN  Body Position: position changed independently  Taken 2024 0738 by Diana Poe, RN  Body Position: position changed independently  Goal: Optimal Comfort and Wellbeing  Outcome: Progressing  Intervention: Provide Person-Centered Care  Recent Flowsheet Documentation  Taken 2024 1511 by Diana Poe, RN  Trust Relationship/Rapport:   care explained   choices provided   questions answered   questions encouraged  Taken 2024 0738 by Diana Poe, RN  Trust Relationship/Rapport:   care explained   choices provided   questions answered   questions encouraged  Goal: Readiness for Transition of Care  Outcome: Progressing     Problem: Postpartum (Vaginal " Delivery)  Goal: Successful Parent Role Transition  Outcome: Progressing  Intervention: Support Parent Role Transition  Recent Flowsheet Documentation  Taken 11/25/2024 1511 by Diana Poe, RN  Supportive Measures:   active listening utilized   counseling provided   decision-making supported   positive reinforcement provided   problem-solving facilitated   self-care encouraged  Parent-Child Attachment Promotion:   caring behavior modeled   cue recognition promoted   face-to-face positioning promoted   interaction encouraged   rooming-in promoted   skin-to-skin contact encouraged   strengths emphasized  Taken 11/25/2024 0738 by Diana Poe, RN  Supportive Measures:   active listening utilized   counseling provided   decision-making supported   positive reinforcement provided   problem-solving facilitated   self-care encouraged  Parent-Child Attachment Promotion:   caring behavior modeled   cue recognition promoted   face-to-face positioning promoted   interaction encouraged   rooming-in promoted   skin-to-skin contact encouraged   strengths emphasized  Goal: Hemostasis  Outcome: Progressing  Goal: Absence of Infection Signs and Symptoms  Outcome: Progressing  Goal: Anesthesia/Sedation Recovery  Outcome: Progressing  Goal: Optimal Pain Control and Function  Outcome: Progressing  Goal: Effective Urinary Elimination  Outcome: Progressing   Goal Outcome Evaluation:      Plan of Care Reviewed With: patient    Overall Patient Progress: improvingOverall Patient Progress: improving

## 2024-11-25 NOTE — PROVIDER NOTIFICATION
11/24/24 1805   Provider Notification   Provider Name/Title Dr. Rebolledo   Method of Notification At Bedside   Request Evaluate in Person     MD at bedside for AROM. Fluid clear. Cervical check 3/80/-2

## 2024-11-25 NOTE — PROVIDER NOTIFICATION
11/24/24 1924   Provider Notification   Provider Name/Title Dr. Rebolledo   Method of Notification Electronic Page     Assuming cares for patient. Reports she is comfortable and not feeling cxs at this time. Leaking clear fluid after AROM. Plan to start pitocin around 1930. MD confirms that pitocin order is for 2x2.

## 2024-11-25 NOTE — PROVIDER NOTIFICATION
11/25/24 0246   Provider Notification   Provider Name/Title Dr. Rebolledo   Method of Notification Electronic Page   Request Evaluate - Remote   Notification Reason SVE     Pt reports feeling more pressure with cxs. Cervix unchanged from previous exam (5/85/-1). Pitocin turned up to 16milleu. Per MD, continue to titrate pitocin until regular pattern again.

## 2024-11-25 NOTE — PROVIDER NOTIFICATION
11/24/24 2210   Provider Notification   Provider Name/Title Dr. Rebolledo   Method of Notification Electronic Page   Request Evaluate - Remote   Notification Reason SVE     Pit at 8 and cxs more regular now, q1.5-3min. Palpating moderate and patient more uncomfortable/breathing through, states she is coping. Rechecked cervix and change to 4/80/-2. MD will come visit with patient in about 20 minutes.
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
## 2024-11-25 NOTE — PROCEDURES
Delivery Note  Diagnosis: Intrauterine pregnancy at 39+ weeks, AMA and VSD  Procedure: Vaginal delivery  Findings: Liveborn male infant, Apgars were 8  and 9  at 1 and 5 minutes respectively. Infant weight not yet recorded due to skin to skin.  Anesthesia: none   QBL: Delivery QBL (mL): 207    Esperanza Guerrero is a 39 year old  female at 39+ weeks gestation. Her pregnancy was notable for AMA and VSD .     She presented for elective IOL.. NST was reactive upon admission. She underwent AROM and Pitocin augmentation. Clear fluid was noted.     Patient progressed to complete and delivered a viable infant without complication. Nuchal cord x 1 was reduced on the perineum.     The infant was placed on the patient's abdomen. Cord clamping was delayed by 1 minutes, at which time the cord was doubly clamped and cut.     The third stage was actively managed with external uterine massage, gentle cord traction and pitocin. The placenta delivered spontaneously and intact. Pitocin was given thereafter.     Second degree laceration noted and repaired in the usual fashion. Excellent hemostasis was noted. All counts were correct before and after the delivery.     Elisa Rebolledo MD

## 2024-11-25 NOTE — LACTATION NOTE
Lactation visit with patient. Patient just finished breastfeeding at time of visit. States baby breastfeeds well. Has no questions or concerns. Reviewed normal course of lactation. Encouraged deep latch, and listening for swallows. Patient states nipples has a different shape on right and has to work harder to get infant on but baby does nurse on both sides. Patient feels confident with feeds, knows she can call for any questions or concerns. Has a pump for home.

## 2024-11-25 NOTE — PROVIDER NOTIFICATION
11/24/24 2227   Provider Notification   Provider Name/Title Dr. Rebolledo   Method of Notification At Bedside   Request Evaluate in Person   Notification Reason Labor Status     Pitocin increased to 10 at 2215. Per MD, continue to titrate q30min until cxs regular and 2-3min apart. Encouraged patient to change positions/move as able. MD will recheck cervix in about one hour, or sooner if needed. Patient and support person agree with POC.

## 2024-11-25 NOTE — PLAN OF CARE
Problem: Adult Inpatient Plan of Care  Goal: Plan of Care Review  Description: The Plan of Care Review/Shift note should be completed every shift.  The Outcome Evaluation is a brief statement about your assessment that the patient is improving, declining, or no change.  This information will be displayed automatically on your shift  note.  Outcome: Progressing  Flowsheets (Taken 11/24/2024 2535)  Plan of Care Reviewed With: patient  Overall Patient Progress: improving  Goal: Readiness for Transition of Care  Intervention: Mutually Develop Transition Plan  Recent Flowsheet Documentation  Taken 11/24/2024 1802 by Sandy Finley, RN  Equipment Currently Used at Home: none   Goal Outcome Evaluation:      Plan of Care Reviewed With: patient    Overall Patient Progress: improvingOverall Patient Progress: improving

## 2024-11-26 VITALS
SYSTOLIC BLOOD PRESSURE: 108 MMHG | RESPIRATION RATE: 18 BRPM | DIASTOLIC BLOOD PRESSURE: 61 MMHG | BODY MASS INDEX: 36.05 KG/M2 | WEIGHT: 210 LBS | TEMPERATURE: 98.1 F | HEART RATE: 87 BPM

## 2024-11-26 PROCEDURE — 250N000013 HC RX MED GY IP 250 OP 250 PS 637: Performed by: OBSTETRICS & GYNECOLOGY

## 2024-11-26 RX ADMIN — IBUPROFEN 800 MG: 800 TABLET, FILM COATED ORAL at 04:48

## 2024-11-26 ASSESSMENT — ACTIVITIES OF DAILY LIVING (ADL)
ADLS_ACUITY_SCORE: 20

## 2024-11-26 NOTE — PLAN OF CARE
Goal Outcome Evaluation:  VSS. Up independently. Tolerating regular diet. Breastfeeding  about every 2-3 hours independently. Encouraged to call for assistance. Minimal pain controlled with ibuprofen. Bonding well with . No support person present overnight. Denies preeclampsia symptoms. Fundus firm @ midline, lochia WDL. Voiding adequately.      Plan of Care Reviewed With: patient    Overall Patient Progress: improvingOverall Patient Progress: improving      Problem: Adult Inpatient Plan of Care  Goal: Plan of Care Review  Description: The Plan of Care Review/Shift note should be completed every shift.  The Outcome Evaluation is a brief statement about your assessment that the patient is improving, declining, or no change.  This information will be displayed automatically on your shift  note.  Outcome: Progressing  Flowsheets (Taken 2024 0311)  Plan of Care Reviewed With: patient  Overall Patient Progress: improving  Goal: Absence of Hospital-Acquired Illness or Injury  Intervention: Prevent Skin Injury  Recent Flowsheet Documentation  Taken 2024 by Karen Lopze RN  Body Position: position changed independently  Goal: Optimal Comfort and Wellbeing  Intervention: Monitor Pain and Promote Comfort  Recent Flowsheet Documentation  Taken 2024 2343 by Karen Lopez RN  Pain Management Interventions: medication offered but refused  Intervention: Provide Person-Centered Care  Recent Flowsheet Documentation  Taken 2024 by Karen Lopez RN  Trust Relationship/Rapport:   care explained   choices provided     Problem: Labor  Goal: Stable Fetal Wellbeing  Intervention: Promote and Monitor Fetal Wellbeing  Recent Flowsheet Documentation  Taken 2024 by Karen Lopez RN  Body Position: position changed independently     Problem: Postpartum (Vaginal Delivery)  Goal: Successful Parent Role Transition  Intervention: Support Parent Role Transition  Recent  Flowsheet Documentation  Taken 11/25/2024 2000 by Karen Lopez RN  Supportive Measures: active listening utilized  Parent-Child Attachment Promotion: caring behavior modeled  Goal: Optimal Pain Control and Function  Intervention: Prevent or Manage Pain  Recent Flowsheet Documentation  Taken 11/25/2024 2343 by Karen Lopez, RN  Pain Management Interventions: medication offered but refused

## 2024-11-26 NOTE — PLAN OF CARE
"Goal Outcome Evaluation:      Plan of Care Reviewed With: patient, parent    Overall Patient Progress: improvingOverall Patient Progress: improving    Outcome Evaluation: stable for discharge    Vital signs within normal limits. Postpartum checks within normal limits - see flow record. Patient eating and drinking normally. Patient able to empty bladder independently and is up ambulating. Patient performing self cares, is able to care for infant and is breastfeeding every 2-3 hours. Patient medicated with ibuprofen and tylenol during the shift for pain. See MAR. Adequate pain control noted by patient. Patient education done, see flow record. Positive attachment behaviors observed with infant. Patient's spouse present this shift.     Discharge instructions completed.  Patient states she understands all discharge instructions and all her questions have been answered.  Verbalizes when she needs to return to clinic for follow up for herself and baby.  She is caring for herself and her baby independently.  Prescriptions reviewed and sent to pharmacy.  Postpartum depression symptoms reviewed and encouraged frequent review of depression scale. Patient discharged home with family transporting at 1430.      Problem: Adult Inpatient Plan of Care  Goal: Plan of Care Review  Description: The Plan of Care Review/Shift note should be completed every shift.  The Outcome Evaluation is a brief statement about your assessment that the patient is improving, declining, or no change.  This information will be displayed automatically on your shift  note.  Outcome: Adequate for Care Transition  Flowsheets (Taken 11/26/2024 1218)  Outcome Evaluation: stable for discharge  Plan of Care Reviewed With:   patient   parent  Overall Patient Progress: improving  Goal: Patient-Specific Goal (Individualized)  Description: You can add care plan individualizations to a care plan. Examples of Individualization might be:  \"Parent requests to be called " "daily at 9am for status\", \"I have a hard time hearing out of my right ear\", or \"Do not touch me to wake me up as it startles  me\".  Outcome: Adequate for Care Transition  Goal: Absence of Hospital-Acquired Illness or Injury  Outcome: Adequate for Care Transition  Intervention: Prevent Skin Injury  Recent Flowsheet Documentation  Taken 11/26/2024 0844 by Ryanne Larkin RN  Body Position: supine, head elevated  Intervention: Prevent Infection  Recent Flowsheet Documentation  Taken 11/26/2024 0844 by Ryanne Larkin RN  Infection Prevention:   hand hygiene promoted   rest/sleep promoted  Goal: Optimal Comfort and Wellbeing  Outcome: Adequate for Care Transition  Intervention: Provide Person-Centered Care  Recent Flowsheet Documentation  Taken 11/26/2024 0844 by Ryanne Larkin RN  Trust Relationship/Rapport:   care explained   choices provided   emotional support provided   empathic listening provided   questions answered   questions encouraged   reassurance provided   thoughts/feelings acknowledged  Goal: Readiness for Transition of Care  Outcome: Adequate for Care Transition  Flowsheets (Taken 11/26/2024 0925)  Anticipated Changes Related to Illness: none  Concerns to be Addressed: all concerns addressed in this encounter  Barriers to Discharge: none  Intervention: Mutually Develop Transition Plan  Recent Flowsheet Documentation  Taken 11/26/2024 0925 by Ryanne Larkin RN  Discharge Coordination/Progress: done  Anticipated Changes Related to Illness: none  Concerns to be Addressed: all concerns addressed in this encounter     Problem: Labor  Goal: Stable Fetal Wellbeing  Intervention: Promote and Monitor Fetal Wellbeing  Recent Flowsheet Documentation  Taken 11/26/2024 0844 by Ryanne Larkin RN  Body Position: supine, head elevated  Goal: Absence of Infection Signs and Symptoms  Intervention: Prevent or Manage Infection  Recent Flowsheet Documentation  Taken 11/26/2024 0844 by Ryanne Larkin" RN  Infection Prevention:   hand hygiene promoted   rest/sleep promoted     Problem: Postpartum (Vaginal Delivery)  Goal: Successful Parent Role Transition  Outcome: Adequate for Care Transition  Goal: Hemostasis  Outcome: Adequate for Care Transition  Goal: Absence of Infection Signs and Symptoms  Outcome: Adequate for Care Transition  Goal: Anesthesia/Sedation Recovery  Outcome: Adequate for Care Transition  Goal: Optimal Pain Control and Function  Outcome: Adequate for Care Transition  Goal: Effective Urinary Elimination  Outcome: Adequate for Care Transition

## 2024-11-26 NOTE — DISCHARGE INSTRUCTIONS
"Postpartum Care at Home With Your Baby: Care Instructions  Overview     After childbirth (postpartum period), your body goes through many changes as you recover. In these weeks after delivery, try to take good care of yourself. Get rest whenever you can and accept help from others.  It may take 4 to 6 weeks to feel like yourself again, and possibly longer if you had a  birth. You may feel sore or very tired as you recover. After delivery, you may continue to have contractions as the uterus returns to the size it was before your pregnancy. You will also have some vaginal bleeding. And you may have pain around the vagina as you heal. Several days after delivery you may also have pain and swelling in your breasts as they fill with milk. There are things you can do at home to help ease these discomforts.  After childbirth, it's common to feel emotional. You may feel irritable, cry easily, and feel happy one minute and sad the next. This is called the \"baby blues.\" Hormone changes are one cause of these emotional changes. These feelings usually get better within a couple of weeks. If they don't, talk to your doctor or midwife.  In the first couple of weeks after you give birth, your doctor or midwife may want to check in with you and make a plan for follow-up care. You will likely have a complete postpartum visit in the first 3 months after delivery. At that time, your doctor or midwife will check on your recovery and see how you're doing. But if you have questions or concerns before then, you can always call your doctor or midwife.  Follow-up care is a key part of your treatment and safety. Be sure to make and go to all appointments, and call your doctor if you are having problems. It's also a good idea to know your test results and keep a list of the medicines you take.  How can you care for yourself at home?  Taking care of your body  Use pads instead of tampons for bleeding. After birth, you will have bloody " vaginal discharge. You may also pass some blood clots that shouldn't be bigger than an egg. Over the next 6 weeks or so, your bleeding should decrease a little every day and slowly change to a pinkish and then whitish discharge.  For cramps or mild pain, try an over-the-counter pain medicine, such as acetaminophen (Tylenol) or ibuprofen (Advil, Motrin). Read and follow all instructions on the label.  To ease pain around the vagina or from hemorrhoids:  Put ice or a cold pack on the area for 10 to 20 minutes at a time. Put a thin cloth between the ice and your skin.  Try sitting in a few inches of warm water (sitz bath) when you can or after bowel movements.  Clean yourself with a gentle squeeze of warm water from a bottle instead of wiping with toilet paper.  Use witch hazel or hemorrhoid pads (such as Tucks).  Try using a cold compress for sore and swollen breasts. And wear a supportive bra that fits.  Ease constipation by drinking plenty of fluids and eating high-fiber foods. Ask your doctor or midwife about over-the-counter stool softeners.  Activity  Rest when you can.  Ask for help from family or friends when you need it.  If you can, have another adult in your home for at least 2 or 3 days after birth.  When you feel ready, try to get some exercise every day. For many people, walking is a good choice. Don't do any heavy exercise until your doctor or midwife says it's okay.  Ask your doctor or midwife when it is okay to have vaginal sex.  If you don't want to get pregnant, talk to your doctor or midwife about birth control options. You can get pregnant even before your period returns. Also, you can get pregnant while you are breastfeeding.  Talk to your doctor or midwife if you want to get pregnant again. They can talk to you about when it is safe.  Emotional health  It's normal to have some sadness, anxiety, and mood swings after delivery. It may help to talk with a trusted friend or family member. You can  also call the Maternal Mental Health Hotline at 9-888-BYK-MAMA (1-306.943.9466) for support. If these mood changes last more than a couple of weeks, talk to your doctor or midwife.  When should you call for help?  Share this information with your partner, family, or a friend. They can help you watch for warning signs.  Call 911  anytime you think you may need emergency care. For example, call if:    You feel you cannot stop from hurting yourself, your baby, or someone else.     You passed out (lost consciousness).     You have chest pain, are short of breath, or cough up blood.     You have a seizure.   Where to get help 24 hours a day, 7 days a week   If you or someone you know talks about suicide, self-harm, a mental health crisis, a substance use crisis, or any other kind of emotional distress, get help right away. You can:    Call the Suicide and Crisis Lifeline at 988.     Call 8-963-303-TALK (1-443.354.3947).     Text HOME to 011972 to access the Crisis Text Line.   Consider saving these numbers in your phone.  Go to [employer] for more information or to chat online.  Call your doctor or midwife now or seek immediate medical care if:    You have signs of hemorrhage (too much bleeding), such as:  Heavy vaginal bleeding. This means that you are soaking through one or more pads in an hour. Or you pass blood clots bigger than an egg.  Feeling dizzy or lightheaded, or you feel like you may faint.  Feeling so tired or weak that you cannot do your usual activities.  A fast or irregular heartbeat.  New or worse belly pain.     You have signs of infection, such as:  A fever.  Increased pain, swelling, warmth, or redness from an incision or wound.  Frequent or painful urination or blood in your urine.  Vaginal discharge that smells bad.  New or worse belly pain.     You have symptoms of a blood clot in your leg (called a deep vein thrombosis), such as:  Pain in the calf, back of the knee, thigh, or groin.  Swelling  "in the leg or groin.  A color change on the leg or groin. The skin may be reddish or purplish, depending on your usual skin color.     You have signs of preeclampsia, such as:  Sudden swelling of your face, hands, or feet.  New vision problems (such as dimness, blurring, or seeing spots).  A severe headache.     You have signs of heart failure, such as:  New or increased shortness of breath.  New or worse swelling in your legs, ankles, or feet.  Sudden weight gain, such as more than 2 to 3 pounds in a day or 5 pounds in a week.  Feeling so tired or weak that you cannot do your usual activities.     You had spinal or epidural pain relief and have:  New or worse back pain.  Increased pain, swelling, warmth, or redness at the injection site.  Tingling, weakness, or numbness in your legs or groin.   Watch closely for changes in your health, and be sure to contact your doctor or midwife if:    Your vaginal bleeding isn't decreasing.     You feel sad, anxious, or hopeless for more than a few days.     You are having problems with your breasts or breastfeeding.   Where can you learn more?  Go to https://www.[employer].net/patiented  Enter Z768 in the search box to learn more about \"Postpartum Care at Home With Your Baby: Care Instructions.\"  Current as of: July 10, 2023  Content Version: 14.2 2024 [facility]   Care instructions adapted under license by your healthcare professional. If you have questions about a medical condition or this instruction, always ask your healthcare professional. [employer] disclaims any warranty or liability for your use of this information.    "

## 2025-07-19 ENCOUNTER — HEALTH MAINTENANCE LETTER (OUTPATIENT)
Age: 40
End: 2025-07-19